# Patient Record
Sex: MALE | Race: WHITE | NOT HISPANIC OR LATINO | URBAN - METROPOLITAN AREA
[De-identification: names, ages, dates, MRNs, and addresses within clinical notes are randomized per-mention and may not be internally consistent; named-entity substitution may affect disease eponyms.]

---

## 2017-02-16 ENCOUNTER — OFFICE VISIT - RIVER FALLS (OUTPATIENT)
Dept: FAMILY MEDICINE | Facility: CLINIC | Age: 52
End: 2017-02-16

## 2017-02-16 ASSESSMENT — MIFFLIN-ST. JEOR: SCORE: 2003.49

## 2017-05-08 ENCOUNTER — OFFICE VISIT - RIVER FALLS (OUTPATIENT)
Dept: FAMILY MEDICINE | Facility: CLINIC | Age: 52
End: 2017-05-08

## 2017-07-06 ENCOUNTER — OFFICE VISIT - RIVER FALLS (OUTPATIENT)
Dept: FAMILY MEDICINE | Facility: CLINIC | Age: 52
End: 2017-07-06

## 2017-07-06 ASSESSMENT — MIFFLIN-ST. JEOR: SCORE: 1966.19

## 2017-07-07 LAB
CHOLEST SERPL-MCNC: 200 MG/DL (ref 125–200)
CHOLEST/HDLC SERPL: 3.8 {RATIO}
CREAT SERPL-MCNC: 0.91 MG/DL (ref 0.7–1.33)
GLUCOSE BLD-MCNC: 96 MG/DL (ref 65–99)
HDLC SERPL-MCNC: 52 MG/DL
LDLC SERPL CALC-MCNC: 110 MG/DL
NONHDLC SERPL-MCNC: 148 MG/DL
PSA SERPL-MCNC: 0.6 NG/ML
TRIGL SERPL-MCNC: 189 MG/DL

## 2017-07-20 ENCOUNTER — OFFICE VISIT - RIVER FALLS (OUTPATIENT)
Dept: FAMILY MEDICINE | Facility: CLINIC | Age: 52
End: 2017-07-20

## 2017-07-20 ASSESSMENT — MIFFLIN-ST. JEOR: SCORE: 1966.19

## 2017-08-22 ENCOUNTER — OFFICE VISIT - RIVER FALLS (OUTPATIENT)
Dept: FAMILY MEDICINE | Facility: CLINIC | Age: 52
End: 2017-08-22

## 2017-08-22 ASSESSMENT — MIFFLIN-ST. JEOR: SCORE: 1982.52

## 2017-08-31 ENCOUNTER — OFFICE VISIT - RIVER FALLS (OUTPATIENT)
Dept: FAMILY MEDICINE | Facility: CLINIC | Age: 52
End: 2017-08-31

## 2017-08-31 ASSESSMENT — MIFFLIN-ST. JEOR: SCORE: 1988.87

## 2017-09-14 ENCOUNTER — AMBULATORY - RIVER FALLS (OUTPATIENT)
Dept: FAMILY MEDICINE | Facility: CLINIC | Age: 52
End: 2017-09-14

## 2017-09-28 ENCOUNTER — AMBULATORY - RIVER FALLS (OUTPATIENT)
Dept: FAMILY MEDICINE | Facility: CLINIC | Age: 52
End: 2017-09-28

## 2017-10-12 ENCOUNTER — AMBULATORY - RIVER FALLS (OUTPATIENT)
Dept: FAMILY MEDICINE | Facility: CLINIC | Age: 52
End: 2017-10-12

## 2017-10-26 ENCOUNTER — AMBULATORY - RIVER FALLS (OUTPATIENT)
Dept: FAMILY MEDICINE | Facility: CLINIC | Age: 52
End: 2017-10-26

## 2017-10-31 ENCOUNTER — COMMUNICATION - RIVER FALLS (OUTPATIENT)
Dept: FAMILY MEDICINE | Facility: CLINIC | Age: 52
End: 2017-10-31

## 2017-10-31 ENCOUNTER — OFFICE VISIT - RIVER FALLS (OUTPATIENT)
Dept: FAMILY MEDICINE | Facility: CLINIC | Age: 52
End: 2017-10-31

## 2017-10-31 LAB
CREAT SERPL-MCNC: 0.83 MG/DL (ref 0.72–1.25)
GLUCOSE BLD-MCNC: 91 MG/DL (ref 65–100)

## 2017-10-31 ASSESSMENT — MIFFLIN-ST. JEOR: SCORE: 2005.2

## 2017-11-09 ENCOUNTER — AMBULATORY - RIVER FALLS (OUTPATIENT)
Dept: FAMILY MEDICINE | Facility: CLINIC | Age: 52
End: 2017-11-09

## 2017-12-07 ENCOUNTER — AMBULATORY - RIVER FALLS (OUTPATIENT)
Dept: FAMILY MEDICINE | Facility: CLINIC | Age: 52
End: 2017-12-07

## 2017-12-21 ENCOUNTER — AMBULATORY - RIVER FALLS (OUTPATIENT)
Dept: FAMILY MEDICINE | Facility: CLINIC | Age: 52
End: 2017-12-21

## 2018-01-04 ENCOUNTER — COMMUNICATION - RIVER FALLS (OUTPATIENT)
Dept: FAMILY MEDICINE | Facility: CLINIC | Age: 53
End: 2018-01-04

## 2018-01-18 ENCOUNTER — AMBULATORY - RIVER FALLS (OUTPATIENT)
Dept: FAMILY MEDICINE | Facility: CLINIC | Age: 53
End: 2018-01-18

## 2018-02-01 ENCOUNTER — AMBULATORY - RIVER FALLS (OUTPATIENT)
Dept: FAMILY MEDICINE | Facility: CLINIC | Age: 53
End: 2018-02-01

## 2018-02-15 ENCOUNTER — AMBULATORY - RIVER FALLS (OUTPATIENT)
Dept: FAMILY MEDICINE | Facility: CLINIC | Age: 53
End: 2018-02-15

## 2018-03-01 ENCOUNTER — AMBULATORY - RIVER FALLS (OUTPATIENT)
Dept: FAMILY MEDICINE | Facility: CLINIC | Age: 53
End: 2018-03-01

## 2018-03-15 ENCOUNTER — AMBULATORY - RIVER FALLS (OUTPATIENT)
Dept: FAMILY MEDICINE | Facility: CLINIC | Age: 53
End: 2018-03-15

## 2018-03-29 ENCOUNTER — AMBULATORY - RIVER FALLS (OUTPATIENT)
Dept: FAMILY MEDICINE | Facility: CLINIC | Age: 53
End: 2018-03-29

## 2018-04-12 ENCOUNTER — AMBULATORY - RIVER FALLS (OUTPATIENT)
Dept: FAMILY MEDICINE | Facility: CLINIC | Age: 53
End: 2018-04-12

## 2018-04-26 ENCOUNTER — AMBULATORY - RIVER FALLS (OUTPATIENT)
Dept: FAMILY MEDICINE | Facility: CLINIC | Age: 53
End: 2018-04-26

## 2018-05-09 ENCOUNTER — OFFICE VISIT - RIVER FALLS (OUTPATIENT)
Dept: FAMILY MEDICINE | Facility: CLINIC | Age: 53
End: 2018-05-09

## 2018-05-09 ASSESSMENT — MIFFLIN-ST. JEOR: SCORE: 1948.95

## 2018-09-11 ENCOUNTER — OFFICE VISIT - RIVER FALLS (OUTPATIENT)
Dept: FAMILY MEDICINE | Facility: CLINIC | Age: 53
End: 2018-09-11

## 2018-09-11 ASSESSMENT — MIFFLIN-ST. JEOR: SCORE: 1966.19

## 2018-12-04 ENCOUNTER — OFFICE VISIT - RIVER FALLS (OUTPATIENT)
Dept: FAMILY MEDICINE | Facility: CLINIC | Age: 53
End: 2018-12-04

## 2018-12-04 ASSESSMENT — MIFFLIN-ST. JEOR: SCORE: 1928.08

## 2018-12-05 LAB
CHOLEST SERPL-MCNC: 267 MG/DL
CHOLEST/HDLC SERPL: 4.9 {RATIO}
CREAT SERPL-MCNC: 0.94 MG/DL (ref 0.7–1.33)
GLUCOSE BLD-MCNC: 89 MG/DL (ref 65–99)
HDLC SERPL-MCNC: 55 MG/DL
LDLC SERPL CALC-MCNC: 175 MG/DL
NONHDLC SERPL-MCNC: 212 MG/DL
PSA SERPL-MCNC: 0.5 NG/ML
TRIGL SERPL-MCNC: 221 MG/DL

## 2019-05-22 ENCOUNTER — OFFICE VISIT - RIVER FALLS (OUTPATIENT)
Dept: FAMILY MEDICINE | Facility: CLINIC | Age: 54
End: 2019-05-22

## 2019-05-22 ASSESSMENT — MIFFLIN-ST. JEOR: SCORE: 1928.08

## 2019-08-29 ENCOUNTER — OFFICE VISIT - RIVER FALLS (OUTPATIENT)
Dept: FAMILY MEDICINE | Facility: CLINIC | Age: 54
End: 2019-08-29

## 2019-08-29 ASSESSMENT — MIFFLIN-ST. JEOR: SCORE: 1928.08

## 2019-10-28 ENCOUNTER — OFFICE VISIT - RIVER FALLS (OUTPATIENT)
Dept: FAMILY MEDICINE | Facility: CLINIC | Age: 54
End: 2019-10-28

## 2019-10-28 ASSESSMENT — MIFFLIN-ST. JEOR: SCORE: 1928.08

## 2019-10-29 ENCOUNTER — COMMUNICATION - RIVER FALLS (OUTPATIENT)
Dept: FAMILY MEDICINE | Facility: CLINIC | Age: 54
End: 2019-10-29

## 2019-10-29 LAB
A/G RATIO - HISTORICAL: 1.9 (ref 1–2.5)
ALBUMIN SERPL-MCNC: 4.4 GM/DL (ref 3.6–5.1)
ALP SERPL-CCNC: 54 UNIT/L (ref 40–115)
ALT SERPL W P-5'-P-CCNC: 24 UNIT/L (ref 9–46)
AST SERPL W P-5'-P-CCNC: 15 UNIT/L (ref 10–35)
BILIRUB SERPL-MCNC: 0.5 MG/DL (ref 0.2–1.2)
BUN SERPL-MCNC: 14 MG/DL (ref 7–25)
BUN/CREAT RATIO - HISTORICAL: NORMAL (ref 6–22)
CALCIUM SERPL-MCNC: 9.1 MG/DL (ref 8.6–10.3)
CHLORIDE BLD-SCNC: 106 MMOL/L (ref 98–110)
CO2 SERPL-SCNC: 27 MMOL/L (ref 20–32)
CREAT SERPL-MCNC: 0.87 MG/DL (ref 0.7–1.33)
EGFRCR SERPLBLD CKD-EPI 2021: 99 ML/MIN/1.73M2
ERYTHROCYTE [DISTWIDTH] IN BLOOD BY AUTOMATED COUNT: 12.9 % (ref 11–15)
ERYTHROCYTE [SEDIMENTATION RATE] IN BLOOD BY WESTERGREN METHOD: 2 MM/H
GLOBULIN: 2.3 (ref 1.9–3.7)
GLUCOSE BLD-MCNC: 98 MG/DL (ref 65–99)
HCT VFR BLD AUTO: 43.1 % (ref 38.5–50)
HGB BLD-MCNC: 15.2 GM/DL (ref 13.2–17.1)
LIPASE SERPL-CCNC: 20 UNIT/L (ref 7–60)
MCH RBC QN AUTO: 30.3 PG (ref 27–33)
MCHC RBC AUTO-ENTMCNC: 35.3 GM/DL (ref 32–36)
MCV RBC AUTO: 86 FL (ref 80–100)
PLATELET # BLD AUTO: 157 10*3/UL (ref 140–400)
PMV BLD: 10.3 FL (ref 7.5–12.5)
POTASSIUM BLD-SCNC: 4 MMOL/L (ref 3.5–5.3)
PROT SERPL-MCNC: 6.7 GM/DL (ref 6.1–8.1)
RBC # BLD AUTO: 5.01 10*6/UL (ref 4.2–5.8)
SODIUM SERPL-SCNC: 140 MMOL/L (ref 135–146)
WBC # BLD AUTO: 6.3 10*3/UL (ref 3.8–10.8)

## 2019-10-31 ENCOUNTER — AMBULATORY - RIVER FALLS (OUTPATIENT)
Dept: FAMILY MEDICINE | Facility: CLINIC | Age: 54
End: 2019-10-31

## 2019-11-02 LAB
CAMPYLOBACTER CULTURE: NORMAL
CLOSTRIDIUM DIFFICILE TOXIN A AND B EIA-QUEST: NORMAL
CRYPTOSPORIDIUM ANTIGEN: NORMAL
GIARDIA ANTIGEN TEST: NORMAL
H PYLORI AG STL QL IA: NORMAL
SALMONELLA/SHIGELLA SCREEN: NORMAL
SHIGA TOXIN 1: NORMAL
TRICHROME #1 - QUEST: NORMAL

## 2019-11-04 ENCOUNTER — COMMUNICATION - RIVER FALLS (OUTPATIENT)
Dept: FAMILY MEDICINE | Facility: CLINIC | Age: 54
End: 2019-11-04

## 2019-11-05 ENCOUNTER — COMMUNICATION - RIVER FALLS (OUTPATIENT)
Dept: FAMILY MEDICINE | Facility: CLINIC | Age: 54
End: 2019-11-05

## 2020-06-16 ENCOUNTER — OFFICE VISIT - RIVER FALLS (OUTPATIENT)
Dept: FAMILY MEDICINE | Facility: CLINIC | Age: 55
End: 2020-06-16

## 2020-06-16 ASSESSMENT — MIFFLIN-ST. JEOR: SCORE: 1928.08

## 2020-08-27 ENCOUNTER — OFFICE VISIT - RIVER FALLS (OUTPATIENT)
Dept: FAMILY MEDICINE | Facility: CLINIC | Age: 55
End: 2020-08-27

## 2020-08-27 ASSESSMENT — MIFFLIN-ST. JEOR: SCORE: 1950.76

## 2020-12-29 ENCOUNTER — OFFICE VISIT - RIVER FALLS (OUTPATIENT)
Dept: FAMILY MEDICINE | Facility: CLINIC | Age: 55
End: 2020-12-29

## 2022-02-11 VITALS
OXYGEN SATURATION: 97 % | SYSTOLIC BLOOD PRESSURE: 120 MMHG | BODY MASS INDEX: 37.62 KG/M2 | BODY MASS INDEX: 37.09 KG/M2 | SYSTOLIC BLOOD PRESSURE: 102 MMHG | HEIGHT: 69 IN | DIASTOLIC BLOOD PRESSURE: 72 MMHG | HEIGHT: 69 IN | TEMPERATURE: 98 F | HEART RATE: 68 BPM | BODY MASS INDEX: 37.09 KG/M2 | DIASTOLIC BLOOD PRESSURE: 74 MMHG | HEART RATE: 67 BPM | SYSTOLIC BLOOD PRESSURE: 118 MMHG | WEIGHT: 250.4 LBS | HEIGHT: 69 IN | TEMPERATURE: 97 F | TEMPERATURE: 98.7 F | BODY MASS INDEX: 37.83 KG/M2 | WEIGHT: 254 LBS | WEIGHT: 250.4 LBS | OXYGEN SATURATION: 95 % | WEIGHT: 255.4 LBS | HEART RATE: 72 BPM | DIASTOLIC BLOOD PRESSURE: 72 MMHG | HEIGHT: 69 IN | DIASTOLIC BLOOD PRESSURE: 68 MMHG | HEART RATE: 68 BPM | SYSTOLIC BLOOD PRESSURE: 116 MMHG

## 2022-02-11 VITALS
WEIGHT: 242 LBS | TEMPERATURE: 97.5 F | BODY MASS INDEX: 35.84 KG/M2 | BODY MASS INDEX: 35.84 KG/M2 | DIASTOLIC BLOOD PRESSURE: 74 MMHG | HEIGHT: 69 IN | WEIGHT: 242 LBS | SYSTOLIC BLOOD PRESSURE: 128 MMHG | HEART RATE: 65 BPM | OXYGEN SATURATION: 98 % | HEIGHT: 69 IN | DIASTOLIC BLOOD PRESSURE: 82 MMHG | HEART RATE: 70 BPM | OXYGEN SATURATION: 98 % | SYSTOLIC BLOOD PRESSURE: 132 MMHG

## 2022-02-11 VITALS
TEMPERATURE: 98.5 F | HEART RATE: 80 BPM | SYSTOLIC BLOOD PRESSURE: 130 MMHG | DIASTOLIC BLOOD PRESSURE: 88 MMHG | BODY MASS INDEX: 36.58 KG/M2 | WEIGHT: 247 LBS | HEIGHT: 69 IN

## 2022-02-11 VITALS
HEIGHT: 69 IN | OXYGEN SATURATION: 98 % | SYSTOLIC BLOOD PRESSURE: 134 MMHG | BODY MASS INDEX: 37.09 KG/M2 | HEART RATE: 69 BPM | DIASTOLIC BLOOD PRESSURE: 88 MMHG | WEIGHT: 250.4 LBS

## 2022-02-11 VITALS
HEIGHT: 69 IN | DIASTOLIC BLOOD PRESSURE: 102 MMHG | HEART RATE: 86 BPM | WEIGHT: 259 LBS | TEMPERATURE: 98.8 F | BODY MASS INDEX: 38.36 KG/M2 | DIASTOLIC BLOOD PRESSURE: 82 MMHG | SYSTOLIC BLOOD PRESSURE: 110 MMHG | SYSTOLIC BLOOD PRESSURE: 152 MMHG

## 2022-02-11 VITALS
SYSTOLIC BLOOD PRESSURE: 134 MMHG | HEART RATE: 76 BPM | WEIGHT: 252.6 LBS | DIASTOLIC BLOOD PRESSURE: 86 MMHG | BODY MASS INDEX: 36.5 KG/M2

## 2022-02-11 VITALS
BODY MASS INDEX: 35.84 KG/M2 | HEIGHT: 69 IN | SYSTOLIC BLOOD PRESSURE: 128 MMHG | OXYGEN SATURATION: 97 % | DIASTOLIC BLOOD PRESSURE: 84 MMHG | HEART RATE: 66 BPM | WEIGHT: 242 LBS

## 2022-02-11 VITALS
DIASTOLIC BLOOD PRESSURE: 76 MMHG | BODY MASS INDEX: 36.53 KG/M2 | SYSTOLIC BLOOD PRESSURE: 130 MMHG | HEART RATE: 63 BPM | WEIGHT: 246.6 LBS | OXYGEN SATURATION: 98 % | HEIGHT: 69 IN

## 2022-02-11 VITALS — BODY MASS INDEX: 36.48 KG/M2 | HEIGHT: 69 IN

## 2022-02-11 VITALS
SYSTOLIC BLOOD PRESSURE: 120 MMHG | HEIGHT: 70 IN | TEMPERATURE: 97.2 F | DIASTOLIC BLOOD PRESSURE: 80 MMHG | OXYGEN SATURATION: 99 % | BODY MASS INDEX: 36.65 KG/M2 | HEART RATE: 64 BPM | WEIGHT: 256 LBS

## 2022-02-11 VITALS
OXYGEN SATURATION: 99 % | SYSTOLIC BLOOD PRESSURE: 128 MMHG | BODY MASS INDEX: 35.84 KG/M2 | DIASTOLIC BLOOD PRESSURE: 86 MMHG | HEART RATE: 57 BPM | HEIGHT: 69 IN | WEIGHT: 242 LBS

## 2022-02-11 VITALS
DIASTOLIC BLOOD PRESSURE: 78 MMHG | WEIGHT: 242 LBS | HEIGHT: 69 IN | BODY MASS INDEX: 35.84 KG/M2 | SYSTOLIC BLOOD PRESSURE: 132 MMHG | OXYGEN SATURATION: 97 % | TEMPERATURE: 98.2 F | HEART RATE: 62 BPM

## 2022-02-16 NOTE — PROGRESS NOTES
Patient:   MIMI MART            MRN: [id number]            FIN: [id number]               Age:   54 years     Sex:  Male     :  1965   Associated Diagnoses:   ADD (attention deficit disorder); GERD (gastroesophageal reflux disease); Allergic Rhinitis   Author:   Max MCLEOD, Karson      Impression and Plan   Diagnosis     ADD (attention deficit disorder) (PUS71-ZR F98.8).     Course:  Progressing as expected.    Orders     Orders   Charges (Evaluation and Management):  00664 office outpatient visit 25 minutes (Charge) (Order): Quantity: 1, Allergic Rhinitis  GERD (gastroesophageal reflux disease)  BPH (benign prostatic hypertrophy)  Hyperlipidemia  ADD (attention deficit disorder).     Orders (Selected)   Prescriptions  Prescribed  methylphenidate 20 mg oral tablet: = 1 tab(s), Oral, daily, # 30 tab(s), 0 Refill(s), Type: Soft Stop, Pharmacy: [facility], 1 tab(s) Oral daily.     Diagnosis     GERD (gastroesophageal reflux disease) (TZH53-BD K21.9).     Course:  Progressing as expected.    Orders     Orders (Selected)   Prescriptions  Prescribed  omeprazole 20 mg oral delayed release capsule: = 1 cap(s), Oral, bid, # 180 cap(s), 1 Refill(s), Type: Maintenance, Pharmacy: [facility].     Diagnosis     Allergic Rhinitis (CKN89-VR J30.1).     Course:  Worsening.    Orders     Orders (Selected)   Outpatient Orders  Completed  Kenalog-40: 80 mg, im, once  Prescriptions  Prescribed  ProAir HFA 90 mcg/inh inhalation aerosol: See Instructions, Instructions: INHALE TWO PUFFS BY MOUTH FOUR TIMES A DAY AS NEEDED FOR WHEEZING OF FOR SHORTNESS OF BREATH, # 8.5 gm, 1 Refill(s), Type: Soft Stop, Pharmacy: [facility], INHALE TWO PUFFS BY MOUTH FOUR TIMES A DAY AS NEEDED FOR...  montelukast 10 mg oral tablet: = 1 tab(s) ( 10 mg ), Oral, daily, # 90 tab(s), 1 Refill(s), Type: Maintenance, Pharmacy: [facility], 1 tab(s) Oral daily.        Visit Information      Date of Service: 2019 04:10 pm   Performing Location: [facility]  Encounter#: [id number]      Primary Care Provider (PCP):  Karson Santana MD, NPI# [id number]      Referring Provider:  Karson Santana MD# [id number]   Visit type:  Scheduled follow-up.    Accompanied by:  No one.    Source of history:  Self.    Referral source:  Self.    History limitation:  None.       Chief Complaint   5/22/2019 4:18 PM CDT    Pt here for allergies        History of Present Illness             The patient presents Attention Deficit Discorder.  There are no modifying factors.  Associated symptoms consist of denies decrease in social interaction, denies fatigue, denies impaired judgement, denies insomnia, denies loss of appetite and denies weight loss.  Additional pertinent history: none.               The patient presents with rhinorrhea.  The rhinorrhea is from both nostrils.  The rhinorrhea is described as clear.  The severity of the rhinorrhea is severe.  The rhinorrhea is constant and is worsening.  The context of the rhinorrhea: occurred after exposure to allergens.  Exacerbating factors consist of pollen.  Relieving factors consist of allergen avoidance and medication.  Associated symptoms consist of itchy eyes, nasal congestion, sneezing, denies fever, denies headache, denies cough, denies ear pain, denies facial pain and denies sore throat.        Interval History   Reflux Esophagitis   The course is progressing as expected.  Compliance problems: none.  The effect on daily activities is no change in eating habits, no change in sleeping patterns and no change in work/school duties.  There were disease related encounters including none.  Associated symptoms characterized by no weight loss, chest pain, wheeze, epigastric pain, hematemesis, loss of appetite or nausea.        Review of Systems   Constitutional:  Fatigue.    Eye:  Negative.    Ear/Nose/Mouth/Throat:  Negative.    Respiratory:  Negative.    Cardiovascular:  Negative.     Gastrointestinal:  Negative.    Genitourinary:  Negative.    Hematology/Lymphatics:  Negative.    Endocrine:  Negative.    Immunologic:  Negative.    Musculoskeletal:  Negative.    Integumentary:  Negative.    Neurologic:  Negative.    Psychiatric:  Negative.    All other systems reviewed and negative      Health Status   Allergies:    Allergic Reactions (Selected)  Severity Not Documented  Dilantin (Rash)   Medications:  (Selected)   Prescriptions  Prescribed  ProAir HFA 90 mcg/inh inhalation aerosol: See Instructions, Instructions: INHALE TWO PUFFS BY MOUTH FOUR TIMES A DAY AS NEEDED FOR WHEEZING OF FOR SHORTNESS OF BREATH, # 8.5 gm, 1 Refill(s), Type: Soft Stop, Pharmacy: [facility], INHALE TWO PUFFS BY MOUTH FOUR TIMES A DAY AS NEEDED FOR...  alfuzosin 10 mg oral tablet, extended release: = 1 tab(s) ( 10 mg ), po, daily, # 90 tab(s), 1 Refill(s), Type: Maintenance, Pharmacy: [facility], 1 tab(s) Oral daily  desoximetasone 0.05% topical cream: 1 jeff, TOP, BID, # 60 g, 1 Refill(s), Type: Maintenance, Pharmacy: [facility], 1 jeff Topical bid  furosemide 20 mg oral tablet: = 1 tab(s) ( 20 mg ), PO, Daily, PRN: for leg swelling, # 90 tab(s), 1 Refill(s), Type: Maintenance, Pharmacy: [facility], 1 tab(s) Oral daily,PRN:for leg swelling  methylphenidate 20 mg oral tablet: = 1 tab(s), Oral, daily, # 30 tab(s), 0 Refill(s), Type: Soft Stop, Pharmacy: [facility], 1 tab(s) Oral daily  montelukast 10 mg oral tablet: = 1 tab(s) ( 10 mg ), Oral, daily, # 90 tab(s), 1 Refill(s), Type: Maintenance, Pharmacy: [facility], 1 tab(s) Oral daily  omeprazole 20 mg oral delayed release capsule: = 1 cap(s), Oral, bid, # 180 cap(s), 1 Refill(s), Type: Maintenance, Pharmacy: [facility]   Problem list:    All Problems  Acne / ICD-9-.1 / Confirmed  ADD (attention deficit disorder) / SNOMED CT 1371620801 / Confirmed  Adenomatous colon polyp / SNOMED CT 3927827164 / Confirmed  Allergic Rhinitis  / ICD-9-.9 / Confirmed  Asthma / ICD-9-.90 / Confirmed  BPH (benign prostatic hypertrophy) / SNOMED CT 394082060 / Confirmed  Chronic Lumbar Pain / ICD-9-.2 / Confirmed  CTS (Carpal Tunnel Syndrome) / ICD-9-.0 / Confirmed  External Hemorrhoids / ICD-9-.3 / Confirmed  GERD (gastroesophageal reflux disease) / SNOMED CT 968414184 / Confirmed  Hyperlipidemia / SNOMED CT 83164766 / Confirmed  Increased BMI / ICD-9-.9 / Confirmed  Low testosterone / SNOMED CT 9633390266 / Confirmed  Lower leg edema / SNOMED CT 632424088 / Confirmed  Male hypogonadism / SNOMED CT 93595168 / Confirmed  Obesity / ICD-9-.00 / Probable  Onychomycosis / ICD-9-.1 / Confirmed  Osteoarthritis of Knee / ICD-9-.96 / Confirmed  Stasis Dermatitis / ICD-9-.1 / Confirmed  Canceled: Hyperlipemia / ICD-9-.4      Histories   Past Medical History:    Active  Onychomycosis (110.1): Onset on 2008 at 43 years.  Comments:  2010 CST 10:46 AM CST - Ericka BLAKE Morena  left small nail  Osteoarthritis of Knee (715.96)  Chronic Lumbar Pain (724.2)  Allergic Rhinitis (477.9)  Asthma (493.90)  External Hemorrhoids (455.3)  Stasis Dermatitis (454.1)  Acne (706.1)  CTS (Carpal Tunnel Syndrome) (354.0)  Increased BMI (783.9)   Family History:    Hypertension  Father ()  Heart disease  Father ()     Procedure history:    Colonoscopy (SNOMED CT 913958155) performed by Duarte Dean MD on 2018 at 53 Years.  Comments:  1/10/2019 9:36 AM CST - Tory Hicks  Indication:  Screening.  Sedation:  MAC.  Findings:  Polyps cecum, sigmoid colon, and right colon.  Thrombosed external hemorrhoids.  Perianal skin tags.  Recommendation:  Repeat in 5 years.   Social History:        Alcohol Assessment: Current            Current, 1-2 times per week      Tobacco Assessment: Denies Tobacco Use      Substance Abuse Assessment: Denies Substance Abuse      Employment and Education Assessment             Employed      Home and Environment Assessment            Marital status: .  2 children.  Living situation: Home/Independent.      Nutrition and Health Assessment                     Comments:                      12/05/2018 - Shamika Astorga                     Restrictions:  Green peppers.      Exercise and Physical Activity Assessment: Regular exercise            Exercise frequency: Daily.  Exercise type: work.      Physical Examination   Vital Signs   5/22/2019 4:18 PM CDT Peripheral Pulse Rate 66 bpm    Systolic Blood Pressure 128 mmHg    Diastolic Blood Pressure 84 mmHg  HI    Mean Arterial Pressure 99 mmHg    Oxygen Saturation 97 %      Measurements from flowsheet : Measurements   5/22/2019 4:18 PM CDT Height Measured - Standard 69 in    Weight Measured - Standard 242 lb    BSA 2.31 m2    Body Mass Index 35.73 kg/m2  HI      General:  No acute distress.    Neck:  Supple, No lymphadenopathy, No thyromegaly.    Respiratory:  Lungs are clear to auscultation, Respirations are non-labored, Breath sounds are equal, Symmetrical chest wall expansion.    Cardiovascular:  Normal rate, Regular rhythm, No murmur, No gallop, Good pulses equal in all extremities, Normal peripheral perfusion, No edema.    Gastrointestinal:  Soft, Non-tender, Non-distended, Normal bowel sounds, No organomegaly.    Integumentary:  Warm, Dry, Pink.    Neurologic:  Alert, Oriented.    Psychiatric:  Cooperative, Appropriate mood & affect.

## 2022-02-16 NOTE — TELEPHONE ENCOUNTER
---------------------  From: Bethany Garcia CMA (eRx Pool ([id number]_[facility]))   To: Karson Santana MD;     Sent: 2/1/2021 4:23:49 PM CST  Subject: FW: Medication Management   Due Date/Time: 2/2/2021 3:18:00 PM CST             ** Submitted: **  Order:alfuzosin (alfuzosin 10 mg oral tablet, extended release)  1 tab(s)  Oral  daily  Qty:  90 unknown unit        Days Supply:  90        Refills:  1          Substitutions Allowed     Route To Pharmacy - [facility]    Signed by Bethany Garcia CMA  2/1/2021 10:23:00 PM UT    ** Submitted: **  Complete:alfuzosin (alfuzosin 10 mg oral tablet, extended release)   Signed by Bethany Garcia CMA  2/1/2021 10:23:00 PM UT    ** Not Approved:  **  alfuzosin (ALFUZOSIN HCL ER 10MG TB24)  TAKE ONE TABLET BY MOUTH EVERY DAY  Qty:  90 unknown unit        Days Supply:  90        Refills:  1          Substitutions Allowed     Route To Pharmacy - [facility]   Signed by Bethany Garcia CMA            ------------------------------------------  From: [facility]  To: Karson Santana MD  Sent: February 1, 2021 3:18:39 PM CST  Subject: Medication Management  Due: January 30, 2021 4:21:54 PM CST     ** On Hold Pending Signature **     Drug: alfuzosin (alfuzosin 10 mg oral tablet, extended release), TAKE ONE TABLET BY MOUTH EVERY DAY  Quantity: 90 unknown unit  Days Supply: 90  Refills: 0  Substitutions Allowed  Notes from Pharmacy:     Dispensed Drug: alfuzosin (alfuzosin 10 mg oral tablet, extended release), TAKE ONE TABLET BY MOUTH EVERY DAY  Quantity: 90 unknown unit  Days Supply: 90  Refills: 1  Substitutions Allowed  Notes from Pharmacy:     ** On Hold Pending Signature **     Drug: methylphenidate (methylphenidate 20 mg oral tablet), TAKE ONE TABLET BY MOUTH EVERY DAY  Quantity: 30 unknown unit  Days Supply: 30  Refills: 0  Substitutions Allowed  Notes from Pharmacy:     Dispensed Drug: methylphenidate (methylphenidate 20 mg oral tablet), TAKE ONE TABLET BY  MOUTH EVERY DAY  Quantity: 30 unknown unit  Days Supply: 30  Refills: 0  Substitutions Allowed  Notes from Pharmacy:  ------------------------------------------

## 2022-02-16 NOTE — PROGRESS NOTES
Patient:   MIMI MART            MRN: [id number]            FIN: [id number]               Age:   53 years     Sex:  Male     :  1965   Associated Diagnoses:   Well adult exam   Author:   Karson Santana MD      Impression and Plan   Diagnosis     Well adult exam (CIC13-NI Z00.00).     Course:  Progressing as expected.    Plan   Orders     Orders   Charges (Evaluation and Management):  27023 periodic preventive med est patient 40-64yrs (Charge) (Order): Quantity: 1, Well adult exam.     Orders (Selected)   Outpatient Orders  Ordered  Referral (Request): 18 16:25:00 CST, Referred to: General Surgery, Referred to: Duarte Dean in [place], Hemorrhoids  Ordered (Dispatched)  CBC (h/h, RBC, indices, WBC, Plt)* ([facility]): Specimen Type: Blood, Collection Date: 18 16:26:00 CST  Comprehensive Metabolic Panel* ([facility]): Specimen Type: Serum, Collection Date: 18 16:26:00 CST  Lipid panel with reflex to direct ldl* ([facility]): Specimen Type: Serum, Collection Date: 18 16:26:00 CST  PSA, Total (Room)* ([facility]): Specimen Type: Serum, Collection Date: 18 16:26:00 CST.        Visit Information      Date of Service: 2018 03:48 pm  Performing Location: [facility]  Encounter#: [id number]      Primary Care Provider (PCP):  Karson Santana MD    NPI# [id number]      Referring Provider:  Karson Santana MD, NPI# [id number]   Visit type:  Annual exam.    Accompanied by:  No one.    Source of history:  Self.    History limitation:  None.       Chief Complaint   Chief complaint discussed and confirmed correct.     2018 3:57 PM CST    Pt here for px        Well Adult History   Well Adult History             The patient presents for well adult exam.  The patient's general health status is described as good.  The patient's diet is described as balanced.  Exercise: routine.  Associated symptoms consist of none.  Medical encounters: none.  Compliance problems: none.        Review of  Systems   Constitutional:  Fatigue, insomnia.    Eye:  Negative.    Ear/Nose/Mouth/Throat:  Negative.    Respiratory:  Negative.    Cardiovascular:  Negative.    Gastrointestinal:  Heartburn, Hemorrhoids.    Genitourinary:  Nocturia.    Hematology/Lymphatics:  Negative.    Endocrine:  Negative.    Immunologic:  Negative.    Musculoskeletal:  Joint pain.         Back pain: In the lower region.    Integumentary:  Negative.    Neurologic:  Negative.    Psychiatric:  Negative.    All other systems reviewed and negative      Health Status   Allergies:    Allergic Reactions (Selected)  Severity Not Documented  Dilantin (Rash)   Medications:  (Selected)   Prescriptions  Prescribed  ProAir HFA 90 mcg/inh inhalation aerosol: See Instructions, Instructions: INHALE TWO PUFFS BY MOUTH FOUR TIMES A DAY AS NEEDED FOR WHEEZING OF FOR SHORTNESS OF BREATH, # 8.5 gm, 1 Refill(s), Type: Soft Stop, Pharmacy: [facility], INHALE TWO PUFFS BY MOUTH FOUR TIMES A DAY AS NEEDED FOR...  alfuzosin 10 mg oral tablet, extended release: = 1 tab(s) ( 10 mg ), po, daily, # 30 tab(s), 11 Refill(s), Type: Maintenance, Pharmacy: [facility], 1 tab(s) Oral daily  desoximetasone 0.05% topical cream: 1 jeff, TOP, BID, # 60 g, 1 Refill(s), Type: Maintenance, Pharmacy: [facility], 1 jeff Topical bid  furosemide 20 mg oral tablet: = 1 tab(s) ( 20 mg ), PO, Daily, PRN: for leg swelling, # 30 tab(s), 11 Refill(s), Type: Maintenance, Pharmacy: [facility], 1 tab(s) Oral daily,PRN:for leg swelling  methylphenidate 20 mg oral tablet: = 1 tab(s) ( 20 mg ), PO, daily, # 30 tab(s), 0 Refill(s), Type: Maintenance, Pharmacy: [facility], 1 tab(s) Oral daily  montelukast 10 mg oral tablet: = 1 tab(s) ( 10 mg ), Oral, daily, # 30 tab(s), 11 Refill(s), Type: Maintenance, Pharmacy: [facility], 1 tab(s) Oral daily  omeprazole 20 mg oral delayed release capsule: = 1 cap(s) ( 20 mg ), po, daily, # 30 cap(s), 11 Refill(s), Type: Maintenance,  Pharmacy: [facility], 1 cap(s) Oral daily   Problem list:    All Problems  Acne / ICD-9-.1 / Confirmed  ADD (attention deficit disorder) / SNOMED CT 8238756606 / Confirmed  Allergic Rhinitis / ICD-9-.9 / Confirmed  Asthma / ICD-9-.90 / Confirmed  BPH (benign prostatic hypertrophy) / SNOMED CT 090097731 / Confirmed  Chronic Lumbar Pain / ICD-9-.2 / Confirmed  CTS (Carpal Tunnel Syndrome) / ICD-9-.0 / Confirmed  External Hemorrhoids / ICD-9-.3 / Confirmed  GERD (gastroesophageal reflux disease) / SNOMED CT 044559340 / Confirmed  Hyperlipidemia / SNOMED CT 89751673 / Confirmed  Increased BMI / ICD-9-.9 / Confirmed  Low testosterone / SNOMED CT 9337541261 / Confirmed  Lower leg edema / SNOMED CT 044410973 / Confirmed  Male hypogonadism / SNOMED CT 39591058 / Confirmed  Obesity / ICD-9-.00 / Probable  Onychomycosis / ICD-9-.1 / Confirmed  Osteoarthritis of Knee / ICD-9-.96 / Confirmed  Stasis Dermatitis / ICD-9-.1 / Confirmed  Canceled: Hyperlipemia / ICD-9-.4      Histories   Past Medical History:    Active  Onychomycosis (110.1): Onset on 2008 at 43 years.  Comments:  2010 CST 10:46 AM CST - Barnett CMA, Morena  left small nail  Osteoarthritis of Knee (715.96)  Chronic Lumbar Pain (724.2)  Allergic Rhinitis (477.9)  Asthma (493.90)  External Hemorrhoids (455.3)  Stasis Dermatitis (454.1)  Acne (706.1)  CTS (Carpal Tunnel Syndrome) (354.0)  Increased BMI (783.9)   Family History:    Hypertension  Father ()  Heart disease  Father ()     Procedure history:    No active procedure history items have been selected or recorded.   Social History:        Alcohol Assessment: Current            Current, 1-2 times per week      Tobacco Assessment: Denies Tobacco Use      Substance Abuse Assessment: Denies Substance Abuse      Employment and Education Assessment            Employed      Home and Environment Assessment             Marital status: .  2 children.  Living situation: Home/Independent.      Exercise and Physical Activity Assessment: Occasional exercise      Physical Examination   Vital signs reviewed  and within acceptable limits    Vital Signs   12/4/2018 3:57 PM CST Peripheral Pulse Rate 57 bpm  LOW    Systolic Blood Pressure 128 mmHg    Diastolic Blood Pressure 86 mmHg  HI    Mean Arterial Pressure 100 mmHg    BP Site Left arm    Oxygen Saturation 99 %      Measurements from flowsheet : Measurements   12/4/2018 3:57 PM CST Height Measured - Standard 69 in    Weight Measured - Standard 242 lb    BSA 2.31 m2    Body Mass Index 35.73 kg/m2  HI      General:  Alert and oriented, No acute distress.    Eye:  Pupils are equal, round and reactive to light, Extraocular movements are intact, Normal conjunctiva.    HENT:  Normocephalic, Tympanic membranes are clear, Normal hearing, Oral mucosa is moist, No pharyngeal erythema.    Neck:  Supple, Non-tender, No carotid bruit, No jugular venous distention, No lymphadenopathy, No thyromegaly.    Respiratory:  Lungs are clear to auscultation, Respirations are non-labored, Breath sounds are equal, Symmetrical chest wall expansion, No chest wall tenderness.    Cardiovascular:  Normal rate, Regular rhythm, No murmur, No gallop, Good pulses equal in all extremities, Normal peripheral perfusion, No edema.    Gastrointestinal:  Soft, Non-tender, Non-distended, Normal bowel sounds, No organomegaly.    Lymphatics:  No lymphadenopathy neck, axilla, groin.    Musculoskeletal:  Normal range of motion, Normal strength, No tenderness, No swelling, No deformity, Normal gait.    Integumentary:  Warm, Dry, Pink.    Neurologic:  Normal sensory, Normal motor function, No focal deficits, Cranial Nerves II-XII are grossly intact, Normal deep tendon reflexes.    Psychiatric:  Cooperative, Appropriate mood & affect, Normal judgment.

## 2022-02-16 NOTE — LETTER
(Inserted Image. Unable to display)       December 05, 2019      MIMI MART  [address] [place], [place] [id number]          Dear MIMI,      Thank you for selecting [facility] for your healthcare needs.     Our records indicate you are due for the following services:    Fasting Lab Tests ~ Please do not eat or drink anything 10 hours prior to your scheduled appointment time.  (Water and any medications that you may need are allowed unless directed otherwise.)    If you had your labs done at another facility or with Direct Access Lab Testing at [facility], please bring in a copy of the results to your next visit, mail a copy, or drop off a copy of your results to your Healthcare Provider.    Annual Well Adult Exam    You are due for lab work and an office visit; please schedule the lab appointment 1 week before the office visit.  This will assure all results are available to discuss with your Healthcare Provider during your visit.    **It is very helpful if you bring your medication bottles to your appointment.  This assures we have all of your current medications, including strength and dosing information, documented accurately in your medical record.    To schedule an appointment or if you have further questions, please contact your primary clinic:   [facility]       [phone]   [facility]       [phone]              [facility]     [phone]    Powered by [employer]    Sincerely,    Karson Santana MD

## 2022-02-16 NOTE — LETTER
(Inserted Image. Unable to display)   [address] [facility] [address]  October 29, 2019      MIMI MART  [address] [place] [id number]        Dear MIMI,     Thank you for selecting [facility] for your healthcare needs. Below you will find the results of the recent tests done at our clinic.        All results are within acceptable limits. No treatment changes are recommended at this time.      Result Name Current Result Previous Result Reference Range   Sodium Level (mmol/L)  140 10/28/2019  141 12/4/2018 135 - 146   Potassium Level (mmol/L)  4.0 10/28/2019  4.1 12/4/2018 3.5 - 5.3   Chloride Level (mmol/L)  106 10/28/2019  103 12/4/2018 98 - 110   CO2 Level (mmol/L)  27 10/28/2019  29 12/4/2018 20 - 32   Glucose Level (mg/dL)  98 10/28/2019  89 12/4/2018 65 - 99   BUN (mg/dL)  14 10/28/2019  19 12/4/2018 7 - 25   Creatinine Level (mg/dL)  0.87 10/28/2019  0.94 12/4/2018 0.70 - 1.33   BUN/Creat Ratio  NOT APPLICABLE 10/28/2019  NOT APPLICABLE 12/4/2018 6 - 22   eGFR (mL/min/1.73m2)  99 10/28/2019  93 12/4/2018 > OR = 60 -    eGFR  (mL/min/1.73m2)  115 10/28/2019  108 12/4/2018 > OR = 60 -    Calcium Level (mg/dL)  9.1 10/28/2019  9.5 12/4/2018 8.6 - 10.3   Bilirubin Total (mg/dL)  0.5 10/28/2019  0.7 12/4/2018 0.2 - 1.2   Alkaline Phosphatase (unit/L)  54 10/28/2019  52 12/4/2018 40 - 115   AST/SGOT (unit/L)  15 10/28/2019  23 12/4/2018 10 - 35   ALT/SGPT (unit/L)  24 10/28/2019  30 12/4/2018 9 - 46   Protein Total (gm/dL)  6.7 10/28/2019  7.0 12/4/2018 6.1 - 8.1   Albumin Level (gm/dL)  4.4 10/28/2019  4.5 12/4/2018 3.6 - 5.1   Globulin  2.3 10/28/2019  2.5 12/4/2018 1.9 - 3.7   A/G Ratio  1.9 10/28/2019  1.8 12/4/2018 1.0 - 2.5   Lipase Level (unit/L)  20 10/28/2019  7 - 60   WBC  6.3 10/28/2019  8.3 12/4/2018 3.8 - 10.8   RBC  5.01 10/28/2019  5.02 12/4/2018 4.20 - 5.80   Hgb (gm/dL)  15.2 10/28/2019  15.7 12/4/2018 13.2 - 17.1   Hct (%)  43.1 10/28/2019   44.0 12/4/2018 38.5 - 50.0   MCV (fL)  86.0 10/28/2019  87.6 12/4/2018 80.0 - 100.0   MCH (pg)  30.3 10/28/2019  31.3 12/4/2018 27.0 - 33.0   MCHC (gm/dL)  35.3 10/28/2019  35.7 12/4/2018 32.0 - 36.0   RDW (%)  12.9 10/28/2019  12.9 12/4/2018 11.0 - 15.0   Platelet  157 10/28/2019  187 12/4/2018 140 - 400   MPV (fL)  10.3 10/28/2019  9.3 12/4/2018 7.5 - 12.5   Sed Rate  2 10/28/2019   - < OR = 20       Please contact my practice at [phone]  if you have any questions or concerns.     Sincerely,        Karson Santana MD          What do your labs mean?  Below is a glossary of commonly ordered labs:  LDL   Bad Cholesterol   HDL   Good Cholesterol  AST/ALT   Liver Function   Cr/Creatinine   Kidney Function  Microalbumin   Kidney Function  BUN   Kidney Function  PSA   Prostate    TSH   Thyroid Hormone  HgbA1c   Diabetes Test   Hgb (Hemoglobin)   Red Blood Cells

## 2022-02-16 NOTE — PROGRESS NOTES
Chief Complaint    1.  F/u ADD  2.  req Kenalog shot for allergies - typically gets in Spring and Fall - allergy to Ragweed  History of Present Illness      Mimi is a 55-year-old with a history of hypogonadism, seasonal allergies, and asthma, presents for Kenalog injection.  He has severe ragweed allergy and typically 2 or 3 times a year he gets Kenalog 80 mg IM for relief.  He is used nasal steroid sprays without much benefit in the past he is on Singulair and albuterol.  He rarely uses albuterol.  He also tells me he like to start on testosterone replacement he was on injection testosterone briefly few years ago and it was helpful but he got acne on his back and did not like him to come in for the injections and he wonders if there is an option.  Review of Systems      No fever, chills, cough, dyspnea, headache, myalgia, wheezing, shortness of breath.  Physical Exam   Vitals & Measurements    T: 98.5  F (Tympanic)  HR: 80 (Peripheral)  BP: 130/88     HT: 69 in  WT: 247 lb  BMI: 36.47       Patient is an obese man in no distress.  Alert and oriented.  HEENT exam is unremarkable.  Chest is clear.  Cardiac exam regular.  Assessment/Plan       Allergic rhinitis, seasonal (J30.2)         I am Kenalog as per usual instructions.  Did discuss the risks and benefits including infection.       Asthma (J45.909)         Stable.       Male hypogonadism (E29.1)        Recommended repeating free and total testosterone level as it is been several years.        Orders:         Return to Clinic (Request), RFV: AM free and total testosterone  Patient Information     Name:MIMI MART      Address:      [address] [place] [id number]     Sex:Male     YOB: 1965     [phone]     Emergency Contact:MADAN BARRETT     MRN:[id number]     FIN:[id number]     Location:[facility]     Date of Service:08/27/2020      Primary Care Physician:       Max MCLEOD, Karson, [phone]       Attending Physician:       Markel Foley MD, [phone]  Problem List/Past Medical History    Ongoing     Acne     ADD (attention deficit disorder)     Adenomatous colon polyp     Allergic Rhinitis     Allergic rhinitis, seasonal     Asthma     BPH (benign prostatic hypertrophy)     Chronic Lumbar Pain     CTS (Carpal Tunnel Syndrome)     External Hemorrhoids     GERD (gastroesophageal reflux disease)     Hyperlipidemia     Increased BMI     Low testosterone     Lower leg edema     Male hypogonadism     Obesity     Onychomycosis       Comments: left small nail     Osteoarthritis of Knee     Stasis Dermatitis    Historical     No qualifying data  Procedure/Surgical History     Colonoscopy (12/19/2018)      Comments: Indication:  Screening.      Sedation:  MAC.      Findings:  Polyps cecum, sigmoid colon, and right colon.  Thrombosed external hemorrhoids.  Perianal skin tags.      Recommendation:  Repeat in 5 years..  Medications    alfuzosin 10 mg oral tablet, extended release, 1 tab(s), Oral, daily, 1 refills    cyclobenzaprine 10 mg oral tablet, 1 tab(s), Oral, q6 hr, PRN, 5 refills    desoximetasone 0.05% topical cream, 1 jeff, Topical, bid, 1 refills    furosemide 20 mg oral tablet, 20 mg= 1 tab(s), Oral, daily, PRN, 1 refills    loratadine 10 mg oral tablet, 10 mg= 1 tab(s), Oral, daily    methylphenidate 20 mg oral tablet, 1 tab(s), Oral, daily    montelukast 10 mg oral tablet, 1 tab(s), Oral, daily, 1 refills    omeprazole 20 mg oral delayed release capsule, 1 cap(s), Oral, bid, 1 refills    ProAir HFA 90 mcg/inh inhalation aerosol, See Instructions, 1 refills  Allergies    Dilantin (rash)  Social History    Smoking Status     Never smoker     Alcohol - Current      Current, 1-2 times per week     Employment/School      Employed     Exercise - Regular exercise      Exercise frequency: Daily. Exercise type: work.     Home/Environment      Marital status: . 2 children. Living situation:  Home/Independent.     Nutrition/Health     Substance Abuse - Denies Substance Abuse     Tobacco - Denies Tobacco Use  Family History    Heart disease: Father.    Hypertension: Father.    Son: History is unknown    Son: History is unknown  Immunizations      Vaccine Date Status          tetanus/diphth/pertuss (Tdap) adult/adol 12/04/2018 Given          tetanus/diphth/pertuss (Tdap) adult/adol 09/08/2008 Recorded

## 2022-02-16 NOTE — NURSING NOTE
LM at 3 20 pm for pt to call [facility] office to speak with myself or Ariella.    (Pt hasnt returned for Testosterone Injection on 11/23/17)Pt called and scheduled his injection for 12/7/17.

## 2022-02-16 NOTE — PROGRESS NOTES
Patient:   MIMI MART            MRN: [id number]            FIN: [id number]               Age:   51 years     Sex:  Male     :  1965   Associated Diagnoses:   Bronchitis   Author:   Karson Santana MD      Impression and Plan   Diagnosis     Bronchitis (QLJ07-IG J20.8).     Course:  Worsening.    Orders     Orders   Charges (Evaluation and Management):  21473 office outpatient visit 15 minutes (Charge) (Order): Quantity: 1, Bronchitis.     Orders (Selected)   Prescriptions  Prescribed  codeine-guaifenesin 10 mg-100 mg/5 mL oral syrup: 10 mL, PO, q4hr, PRN: for cough, # 240 mL, 0 Refill(s), Type: Maintenance, Pharmacy: [facility], 10 mL po q4 hrs,PRN:for cough  doxycycline hyclate 100 mg oral tablet: 1 tab(s) ( 100 mg ), PO, bid, # 60 tab(s), 0 Refill(s), Type: Maintenance, Pharmacy: [facility], 1 tab(s) po bid,x30 day(s).        Visit Information      Date of Service: 2017 02:41 pm  Performing Location: [facility]  Encounter#: [id number]      Primary Care Provider (PCP):  Karson Santana MD    NPI# [id number]   Visit type:  New symptom.    Accompanied by:  No one.    Source of history:  Self.    History limitation:  None.       Chief Complaint   Chief complaint discussed and confirmed correct.     2017 2:44 PM CST    Pt here for cough and congestion        History of Present Illness             The patient presents with cough.  The cough is described as paroxysmal and productive yellow sputum.  The severity of the cough is severe.  The cough is worsening.  The cough has lasted for 3 week(s).  The context of the cough: occurred in association with illness.  There are no modifying factors.  Associated symptoms consist of sore throat.        Review of Systems   Constitutional:  Negative.    Eye:  Negative.    Ear/Nose/Mouth/Throat:  Negative.    Respiratory:  Cough.    Cardiovascular:  Negative.    Gastrointestinal:  Negative.    Genitourinary:  Negative.     Hematology/Lymphatics:  Negative.    Endocrine:  Negative.    Immunologic:  Negative.    Musculoskeletal:  Negative.    Integumentary:  Negative.    Neurologic:  Negative.    Psychiatric:  Negative.          All other systems reviewed and negative      Health Status   Allergies:    Allergic Reactions (Selected)  Severity Not Documented  Dilantin (Rash)   Medications:  (Selected)   Prescriptions  Prescribed  Allegra 180 mg oral tablet: 1 tab(s) ( 180 mg ), po, daily, # 30 tab(s), 11 Refill(s), Type: Maintenance, Pharmacy: [facility], 1 tab(s) po daily  ProAir HFA 90 mcg/inh inhalation aerosol: See Instructions, Instructions: INHALE TWO PUFFS BY MOUTH FOUR TIMES A DAY AS NEEDED FOR WHEEZING OF FOR SHORTNESS OF BREATH, # 8.5 gm, 1 Refill(s), Type: Soft Stop, Pharmacy: [facility], INHALE TWO PUFFS BY MOUTH FOUR TIMES A DAY AS NEEDED FOR...  Singulair 10 mg oral tablet: 1 tab(s) ( 10 mg ), PO, qPM, PRN: for allergy symptoms, # 30 tab(s), 11 Refill(s), Type: Maintenance, Pharmacy: [facility], 1 tab(s) po qpm,PRN:for allergy symptoms  Sudafed 12-Hour 120 mg oral tablet, extended release: 1 tab(s) ( 120 mg ), po, q12 hrs, Instructions: may substitute 24 hour formulation, # 60 tab(s), 11 Refill(s), Type: Maintenance, Pharmacy: [facility], 1 tab(s) po q12 hrs,Instr:may substitute 24 hour formulation  alfuzosin 10 mg oral tablet, extended release: 1 tab(s) ( 10 mg ), po, daily, # 30 tab(s), 5 Refill(s), Type: Maintenance, Pharmacy: [facility], 1 tab(s) po daily  codeine-guaifenesin 10 mg-100 mg/5 mL oral syrup: 10 mL, PO, q4hr, PRN: for cough, # 240 mL, 0 Refill(s), Type: Maintenance, Pharmacy: [facility], 10 mL po q4 hrs,PRN:for cough  cyclobenzaprine 10 mg oral tablet: 1 tab(s), PO, q6hr, PRN: for spasm, # 30 tab(s), 5 Refill(s), Type: Maintenance, Pharmacy: [facility], 1 tab(s) po q6 hr,PRN:for spasm  doxycycline hyclate 100 mg oral tablet: 1 tab(s) ( 100 mg ), PO, bid, # 60  tab(s), 0 Refill(s), Type: Maintenance, Pharmacy: [facility], 1 tab(s) po bid,x30 day(s)  furosemide 20 mg oral tablet: 1 tab(s) ( 20 mg ), PO, Daily, PRN: for leg swelling, # 30 tab(s), 11 Refill(s), Type: Maintenance, Pharmacy: [facility], 1 tab(s) po daily,PRN:for leg swelling  methylphenidate 20 mg oral tablet: 1 tab(s) ( 20 mg ), PO, daily, # 30 tab(s), 0 Refill(s), Type: Maintenance, Pharmacy: [facility], 1 tab(s) po daily  omeprazole 20 mg oral delayed release capsule: 1 cap(s) ( 20 mg ), po, bid, # 60 cap(s), 11 Refill(s), Type: Maintenance, Pharmacy: [facility], 1 cap(s) po bid   Problem list:    All Problems  Acne / ICD-9-.1 / Confirmed  ADD (attention deficit disorder) / SNOMED CT 4231964958 / Confirmed  Allergic Rhinitis / ICD-9-.9 / Confirmed  Asthma / ICD-9-.90 / Confirmed  BPH (benign prostatic hypertrophy) / SNOMED CT 329825944 / Confirmed  Chronic Lumbar Pain / ICD-9-.2 / Confirmed  CTS (Carpal Tunnel Syndrome) / ICD-9-.0 / Confirmed  External Hemorrhoids / ICD-9-.3 / Confirmed  GERD (gastroesophageal reflux disease) / SNOMED CT 636579840 / Confirmed  Hyperlipidemia / SNOMED CT 06752728 / Confirmed  Increased BMI / ICD-9-.9 / Confirmed  Lower leg edema / SNOMED CT 359781045 / Confirmed  Obesity / ICD-9-.00 / Probable  Onychomycosis / ICD-9-.1 / Confirmed  Osteoarthritis of Knee / ICD-9-.96 / Confirmed  Stasis Dermatitis / ICD-9-.1 / Confirmed  Canceled: Hyperlipemia / ICD-9-.4      Histories   Past Medical History:    Active  Onychomycosis (110.1): Onset on 8/27/2008 at 43 years.  Comments:  2/8/2010 CST 10:46 AM CST - Barnett CMA, More[place]  left small nail  Osteoarthritis of Knee (715.96)  Chronic Lumbar Pain (724.2)  Allergic Rhinitis (477.9)  Asthma (493.90)  External Hemorrhoids (455.3)  Stasis Dermatitis (454.1)  Acne (706.1)  CTS (Carpal Tunnel Syndrome) (354.0)  Increased BMI (783.9)   Family History:     Hypertension  Father ()  Heart disease  Father ()     Procedure history:    No active procedure history items have been selected or recorded.   Social History:        Alcohol Assessment: Current            Current, 1-2 times per week      Tobacco Assessment: Denies Tobacco Use      Exercise and Physical Activity Assessment: Occasional exercise        Physical Examination   Vital signs reviewed  and within acceptable limits    Vital Signs   2017 2:44 PM CST Temperature Tympanic 97.2 DegF  LOW    Peripheral Pulse Rate 64 bpm    Systolic Blood Pressure 120 mmHg    Diastolic Blood Pressure 80 mmHg    Mean Arterial Pressure 93 mmHg    BP Site Right arm    Oxygen Saturation 99 %      Measurements from flowsheet : Measurements   2017 2:44 PM CST Height Measured - Standard 69.75 in    Weight Measured - Standard 256 lb    BSA 2.39 m2    Body Mass Index 36.99 kg/m2      General:  Alert and oriented, No acute distress.    Eye:  Pupils are equal, round and reactive to light, Extraocular movements are intact, Normal conjunctiva.    HENT:  Normocephalic, Tympanic membranes are clear, Normal hearing, Oral mucosa is moist, No pharyngeal erythema, No sinus tenderness.    Neck:  Supple, Non-tender, No lymphadenopathy, No thyromegaly.    Respiratory:  Lungs are clear to auscultation, Respirations are non-labored, Breath sounds are equal, demonstrates frequent loose cough.    Cardiovascular:  Normal rate, Regular rhythm, No murmur, Normal peripheral perfusion, No edema.    Integumentary:  Warm, Dry, Pink.    Psychiatric:  Cooperative, Appropriate mood & affect, Normal judgment.

## 2022-02-16 NOTE — PROGRESS NOTES
Patient:   MIMI MART            MRN: [id number]            FIN: [id number]               Age:   53 years     Sex:  Male     :  1965   Associated Diagnoses:   Left shoulder pain   Author:   Karson Santana MD      Impression and Plan   Diagnosis     Left shoulder pain (XXW43-YM M25.512).     Orders     Orders (Selected)   Outpatient Orders  Ordered  kenalog injection: 40 mg, Intra-articular, once  Completed   arthrocentesis aspir+/injection major jt/bursa (Charge): Quantity: 1, Left shoulder pain.     Counseled:  Patient, Regarding diagnosis, Regarding treatment, Regarding medications.       Procedure   Joint aspiration/ injection procedure   Date/ Time:  2018 4:51:00 PM.     Confirmed: patient, procedure, side, site, safety procedures followed.     Performed by: Karson Santana MD.     Informed consent: verbal consent by patient.     Indication: symptomatic relief.     Location: the left shoulder.     Preparation and technique: skin prep povidone iodine (Betadine), sterile preparation of site (in usual fashion, with 10 % povidone iodine), sterile needle used (size #25 gauge, length 1.5 inch, Posterior approach).     Joint injected: with  Triamcinolone (40  mg, 1.0  ml).     Procedure tolerated: well.

## 2022-02-16 NOTE — TELEPHONE ENCOUNTER
Entered by Bethany Garcia CMA on May 16, 2019 4:18:32 PM CDT  ---------------------  From: Bethany Garcia CMA   To: [facility]    Sent: 5/16/2019 4:18:32 PM CDT  Subject: Medication Management     ** Submitted: **  Order:omeprazole (omeprazole 20 mg oral delayed release capsule)  1 cap(s)  Oral  bid  Qty:  180 cap(s)        Refills:  1          Substitutions Allowed     Route To     Signed by Bethany Garcia CMA  5/16/2019 4:18:00 PM    ** Submitted: **  Complete:omeprazole (omeprazole 20 mg oral delayed release capsule)   Signed by Bethany Garcia CMA  5/16/2019 4:18:00 PM    ** Not Approved:  **  omeprazole (OMEPRAZOLE 20MG CPDR)  TAKE ONE CAPSULE BY MOUTH TWICE A DAY  Qty:  60 cap(s)        Days Supply:  30        Refills:  11          Substitutions Allowed     Route To    Signed by Bethany Garcia CMA            ------------------------------------------  From: [facility]  To: Karson Santana MD  Sent: May 16, 2019 4:10:03 PM CDT  Subject: Medication Management  Due: May 17, 2019 4:10:03 PM CDT    ** On Hold Pending Signature **  Drug: omeprazole (omeprazole 20 mg oral delayed release capsule)  TAKE ONE CAPSULE BY MOUTH TWICE A DAY  Quantity: 60 cap(s)     Days Supply: 30        Refills: 11  Substitutions Allowed  Notes from Pharmacy:     Dispensed Drug: omeprazole (omeprazole 20 mg oral delayed release capsule)  TAKE ONE CAPSULE BY MOUTH TWICE A DAY  Quantity: 60 cap(s)     Days Supply: 30        Refills: 11  Substitutions Allowed  Notes from Pharmacy:   ------------------------------------------

## 2022-02-16 NOTE — PROGRESS NOTES
Patient:   MIMI MART            MRN: [id number]            FIN: [id number]               Age:   52 years     Sex:  Male     :  1965   Associated Diagnoses:   Well adult exam   Author:   Karson Santana MD      Impression and Plan   Diagnosis     Well adult exam (ZHR88-AX Z00.00).     Plan:  Eye exam yearly: UTD  Dental exam yearly: UTD  Hearing exam yearly: UTD  Depression Screening yearly: UTD  Prostate screen male every year: ordered  Colorectal yearly (every ten years colonoscopy): ordered  Influenza vaccine yearly: recommended  Adacel every ten years: UTD  Lipid screen every five years: ordered  Fasting Glucose every five years: ordered.    Orders     Orders   Charges (Evaluation and Management):  26687 periodic preventive med est patient 40-64yrs (Charge) (Order): Quantity: 1, Well adult exam.     Orders (Selected)   Outpatient Orders  Ordered  Colonoscopy (Request): Cancer screening  Referral (Request): 17 10:01:00 CDT, Referred to: Cardiology, Referred to: Dr. Thomas, Claud[place]  Ordered (Dispatched)  CBC (h/h, RBC, indices, WBC, Plt)* ([facility]): Specimen Type: Blood, Collection Date: 17 9:56:00 CDT  Comprehensive Metabolic Panel* ([facility]): Specimen Type: Serum, Collection Date: 17 9:56:00 CDT  Lipid panel with reflex to direct ldl* ([facility]): Specimen Type: Serum, Collection Date: 17 9:56:00 CDT  PSA, Total (Room)* ([facility]): Specimen Type: Serum, Collection Date: 17 9:59:00 CDT  TSH* ([facility]): Specimen Type: Serum, Collection Date: 17 9:56:00 CDT  Testosterone, Total (Males)* ([facility]): Specimen Type: Serum, Collection Date: 17 9:56:00 CDT.     Summary:  Polysomnogram recommended due to chronic fatigue.       Visit Information      Date of Service: 2017 09:20 am  Performing Location: [facility]  Encounter#: [id number]      Primary Care Provider (PCP):  Karson Santana MD    NPI# [id number]      Referring Provider:  Karson Santana MD    NPI#  [id number]   Visit type:  Annual exam.    Accompanied by:  No one.    Source of history:  Self.    History limitation:  None.       Chief Complaint   Chief complaint discussed and confirmed correct.     7/6/2017 9:37 AM CDT     Physical        Well Adult History   Well Adult History             The patient presents for well adult exam.  The patient's general health status is described as fair.  The patient's diet is described as balanced.  Exercise: occasional.  Associated symptoms consist of weight gain and fatigue.  Medical encounters: none.  Compliance problems: none.        Review of Systems   Constitutional:  Fatigue, Decreased activity, over weight.    Eye:  Negative.    Ear/Nose/Mouth/Throat:  Negative.    Respiratory:  Negative.    Cardiovascular:  Negative.    Gastrointestinal:  Negative.    Genitourinary:  Negative, decreased libido.    Hematology/Lymphatics:  Negative.    Endocrine:  Negative.    Immunologic:  Negative.    Musculoskeletal:  Joint pain.    Integumentary:  Negative, acne lesion of scalp.    Neurologic:  Negative.    Psychiatric:  Negative.    All other systems reviewed and negative      Health Status   Allergies:    Allergic Reactions (Selected)  Severity Not Documented  Dilantin (Rash)   Medications:  (Selected)   Prescriptions  Prescribed  Allegra 180 mg oral tablet: 1 tab(s) ( 180 mg ), po, daily, # 30 tab(s), 11 Refill(s), Type: Maintenance, Pharmacy: [facility], 1 tab(s) po daily  ProAir HFA 90 mcg/inh inhalation aerosol: See Instructions, Instructions: INHALE TWO PUFFS BY MOUTH FOUR TIMES A DAY AS NEEDED FOR WHEEZING OF FOR SHORTNESS OF BREATH, # 8.5 gm, 1 Refill(s), Type: Soft Stop, Pharmacy: [facility], INHALE TWO PUFFS BY MOUTH FOUR TIMES A DAY AS NEEDED FOR...  Singulair 10 mg oral tablet: 1 tab(s) ( 10 mg ), PO, qPM, PRN: for allergy symptoms, # 30 tab(s), 11 Refill(s), Type: Maintenance, Pharmacy: [facility], 1 tab(s) po qpm,PRN:for allergy symptoms  Sudafed  12-Hour 120 mg oral tablet, extended release: 1 tab(s) ( 120 mg ), po, q12 hrs, Instructions: may substitute 24 hour formulation, # 60 tab(s), 11 Refill(s), Type: Maintenance, Pharmacy: [facility], 1 tab(s) po q12 hrs,Instr:may substitute 24 hour formulation  alfuzosin 10 mg oral tablet, extended release: 1 tab(s) ( 10 mg ), po, daily, # 30 tab(s), 11 Refill(s), Type: Maintenance, Pharmacy: [facility], 1 tab(s) po daily  cyclobenzaprine 10 mg oral tablet: 1 tab(s), PO, q6hr, PRN: for spasm, # 30 tab(s), 5 Refill(s), Type: Maintenance, Pharmacy: [facility], 1 tab(s) po q6 hr,PRN:for spasm  furosemide 20 mg oral tablet: 1 tab(s) ( 20 mg ), PO, Daily, PRN: for leg swelling, # 30 tab(s), 11 Refill(s), Type: Maintenance, Pharmacy: [facility], 1 tab(s) po daily,PRN:for leg swelling  methylphenidate 20 mg oral tablet: 1 tab(s) ( 20 mg ), PO, daily, # 30 tab(s), 0 Refill(s), Type: Maintenance  omeprazole 20 mg oral delayed release capsule: 1 cap(s) ( 20 mg ), po, bid, # 60 cap(s), 11 Refill(s), Type: Maintenance, Pharmacy: [facility], 1 cap(s) po bid   Problem list:    All Problems  Acne / ICD-9-.1 / Confirmed  ADD (attention deficit disorder) / SNOMED CT 5035967192 / Confirmed  Allergic Rhinitis / ICD-9-.9 / Confirmed  Asthma / ICD-9-.90 / Confirmed  BPH (benign prostatic hypertrophy) / SNOMED CT 729089059 / Confirmed  Chronic Lumbar Pain / ICD-9-.2 / Confirmed  CTS (Carpal Tunnel Syndrome) / ICD-9-.0 / Confirmed  External Hemorrhoids / ICD-9-.3 / Confirmed  GERD (gastroesophageal reflux disease) / SNOMED CT 379099121 / Confirmed  Hyperlipidemia / SNOMED CT 22889121 / Confirmed  Increased BMI / ICD-9-.9 / Confirmed  Lower leg edema / SNOMED CT 417383064 / Confirmed  Obesity / ICD-9-.00 / Probable  Onychomycosis / ICD-9-.1 / Confirmed  Osteoarthritis of Knee / ICD-9-.96 / Confirmed  Stasis Dermatitis / ICD-9-.1 /  Confirmed  Canceled: Hyperlipemia / ICD-9-.4      Histories   Past Medical History:    Active  Onychomycosis (110.1): Onset on 2008 at 43 years.  Comments:  2010 CST 10:46 AM CST - Barnett Morena BLAKE  left small nail  Osteoarthritis of Knee (715.96)  Chronic Lumbar Pain (724.2)  Allergic Rhinitis (477.9)  Asthma (493.90)  External Hemorrhoids (455.3)  Stasis Dermatitis (454.1)  Acne (706.1)  CTS (Carpal Tunnel Syndrome) (354.0)  Increased BMI (783.9)   Family History:    Hypertension  Father ()  Heart disease  Father ()     Procedure history:    No active procedure history items have been selected or recorded.   Social History:        Alcohol Assessment: Current            Current, 1-2 times per week      Tobacco Assessment: Denies Tobacco Use      Exercise and Physical Activity Assessment: Occasional exercise        Physical Examination   Vital signs reviewed  and within acceptable limits    Vital Signs   2017 9:37 AM CDT Temperature Tympanic 98.0 DegF    Peripheral Pulse Rate 68 bpm    Systolic Blood Pressure 116 mmHg    Diastolic Blood Pressure 72 mmHg    Mean Arterial Pressure 87 mmHg      Measurements from flowsheet : Measurements   2017 9:37 AM CDT Height Measured - Standard 69 in    Weight Measured - Standard 250.4 lb    BSA 2.35 m2    Body Mass Index 36.97 kg/m2      General:  Alert and oriented, No acute distress.    Eye:  Pupils are equal, round and reactive to light, Extraocular movements are intact, Normal conjunctiva.    HENT:  Normocephalic, Tympanic membranes are clear, Normal hearing, Oral mucosa is moist, No pharyngeal erythema.    Neck:  Supple, Non-tender, No carotid bruit, No jugular venous distention, No lymphadenopathy, No thyromegaly.    Respiratory:  Lungs are clear to auscultation, Respirations are non-labored, Breath sounds are equal, Symmetrical chest wall expansion, No chest wall tenderness.    Cardiovascular:  Normal rate, Regular rhythm, No murmur,  No gallop, Good pulses equal in all extremities, Normal peripheral perfusion, No edema.    Gastrointestinal:  Soft, Non-tender, Non-distended, Normal bowel sounds, No organomegaly.    Lymphatics:  No lymphadenopathy neck, axilla, groin.    Musculoskeletal:  Normal range of motion, Normal strength, No tenderness, No swelling, No deformity, Normal gait.    Integumentary:  Warm, Dry, Pink.    Neurologic:  Normal sensory, Normal motor function, No focal deficits, Cranial Nerves II-XII are grossly intact, Normal deep tendon reflexes.    Psychiatric:  Cooperative, Appropriate mood & affect, Normal judgment.

## 2022-02-16 NOTE — LETTER
(Inserted Image. Unable to display)   [address] [facility] [address]  November 04, 2019      MIMI MART  [address] [place] [phone]        Dear MIMI,     Thank you for selecting [facility] for your healthcare needs. Below you will find the results of the recent tests done at our clinic.      The Clostridium, Helicobacter, Cryptosporidium and Giardia were all NEGATIVE.  The rest of the tests are still being processed.      Result Name Current Result   Clostridium difficile Toxin  See comment 10/31/2019   Helicobacter pylori Ag Stool  See comment 10/31/2019   Culture Campylobacter  See comment 10/31/2019   Culture Salmonella/Shigella  See comment 10/31/2019   Shiga Toxin  See comment 10/31/2019   Trichrome 1  See comment 10/31/2019   Concentrate 1  See comment 10/31/2019   Cryptosporidium Ag  See comment 10/31/2019   Giardia Ag  See comment 10/31/2019       Please contact my practice at [phone]  if you have any questions or concerns.     Sincerely,        Karson Santana MD          What do your labs mean?  Below is a glossary of commonly ordered labs:  LDL   Bad Cholesterol   HDL   Good Cholesterol  AST/ALT   Liver Function   Cr/Creatinine   Kidney Function  Microalbumin   Kidney Function  BUN   Kidney Function  PSA   Prostate    TSH   Thyroid Hormone  HgbA1c   Diabetes Test   Hgb (Hemoglobin)   Red Blood Cells

## 2022-02-16 NOTE — NURSING NOTE
Comprehensive Intake Entered On:  12/29/2020 11:16 AM CST    Performed On:  12/29/2020 11:11 AM CST by Horace Dean CMA               Summary   Chief Complaint :   Verbal consent given for video visit.  Pt is c/o sinus pressure x month.  Pt states he had positive COVID test 11/26/20.  Pt also requesting refill on his omeprazole to [facility] pharmacy in [place].   Height Measured :   69 in(Converted to: 5 ft 9 in, 175.26 cm)    Horace Dean CMA - 12/29/2020 11:11 AM CST   Health Status   Allergies Verified? :   Yes   Medication History Verified? :   Yes   Medical History Verified? :   Yes   Pre-Visit Planning Status :   Not completed   Tobacco Use? :   Never smoker   Horace Dean CMA - 12/29/2020 11:11 AM CST   Meds / Allergies   (As Of: 12/29/2020 11:16:16 AM CST)   Allergies (Active)   Dilantin  Estimated Onset Date:   Unspecified ; Reactions:   rash ; Created By:   Morena Barnett; Reaction Status:   Active ; Category:   Drug ; Substance:   Dilantin ; Type:   Allergy ; Updated By:   Morena Barnett; Reviewed Date:   6/16/2020 2:40 PM CDT        Medication List   (As Of: 12/29/2020 11:16:16 AM CST)   Prescription/Discharge Order    methylphenidate  :   methylphenidate ; Status:   Prescribed ; Ordered As Mnemonic:   methylphenidate 20 mg oral tablet ; Simple Display Line:   1 tab(s), Oral, daily, 30 tab(s), 0 Refill(s) ; Ordering Provider:   Karson Santana MD; Catalog Code:   methylphenidate ; Order Dt/Tm:   11/11/2020 12:10:42 PM CST          alfuzosin  :   alfuzosin ; Status:   Prescribed ; Ordered As Mnemonic:   alfuzosin 10 mg oral tablet, extended release ; Simple Display Line:   1 tab(s), Oral, daily, 90 tab(s), 1 Refill(s) ; Ordering Provider:   Karson Santana MD; Catalog Code:   alfuzosin ; Order Dt/Tm:   6/16/2020 2:56:59 PM CDT          montelukast  :   montelukast ; Status:   Prescribed ; Ordered As Mnemonic:   montelukast 10 mg oral tablet ; Simple Display Line:   1 tab(s), Oral, daily, 90 tab(s), 1  Refill(s) ; Ordering Provider:   Karson Santana MD; Catalog Code:   montelukast ; Order Dt/Tm:   6/16/2020 2:56:51 PM CDT          omeprazole  :   omeprazole ; Status:   Prescribed ; Ordered As Mnemonic:   omeprazole 20 mg oral delayed release capsule ; Simple Display Line:   1 cap(s), Oral, bid, 180 cap(s), 1 Refill(s) ; Ordering Provider:   Karson Santana MD; Catalog Code:   omeprazole ; Order Dt/Tm:   6/16/2020 2:56:26 PM CDT          cyclobenzaprine  :   cyclobenzaprine ; Status:   Prescribed ; Ordered As Mnemonic:   cyclobenzaprine 10 mg oral tablet ; Simple Display Line:   1 tab(s), PO, q6hr, PRN: for spasm, 30 tab(s), 5 Refill(s) ; Ordering Provider:   Karson Santana MD; Catalog Code:   cyclobenzaprine ; Order Dt/Tm:   10/28/2019 9:39:12 AM CDT          desoximetasone topical  :   desoximetasone topical ; Status:   Prescribed ; Ordered As Mnemonic:   desoximetasone 0.05% topical cream ; Simple Display Line:   1 jeff, TOP, BID, 60 g, 1 Refill(s) ; Ordering Provider:   Karson Santana MD; Catalog Code:   desoximetasone topical ; Order Dt/Tm:   5/22/2019 4:39:13 PM CDT          albuterol  :   albuterol ; Status:   Prescribed ; Ordered As Mnemonic:   ProAir HFA 90 mcg/inh inhalation aerosol ; Simple Display Line:   See Instructions, INHALE TWO PUFFS BY MOUTH FOUR TIMES A DAY AS NEEDED FOR WHEEZING OF FOR SHORTNESS OF BREATH, 8.5 gm, 1 Refill(s) ; Ordering Provider:   Karson Santana MD; Catalog Code:   albuterol ; Order Dt/Tm:   5/22/2019 4:39:06 PM CDT          furosemide  :   furosemide ; Status:   Prescribed ; Ordered As Mnemonic:   furosemide 20 mg oral tablet ; Simple Display Line:   20 mg, 1 tab(s), PO, Daily, PRN: for leg swelling, 90 tab(s), 1 Refill(s) ; Ordering Provider:   Karson Santana MD; Catalog Code:   furosemide ; Order Dt/Tm:   5/22/2019 4:38:52 PM CDT            Home Meds    loratadine  :   loratadine ; Status:   Documented ; Ordered As Mnemonic:   loratadine 10 mg oral tablet ; Simple Display  Line:   10 mg, 1 tab(s), Oral, daily, 0 Refill(s) ; Catalog Code:   loratadine ; Order Dt/Tm:   8/27/2020 3:59:04 PM CDT            ID Risk Screen   Recent Travel History :   No recent travel   Family Member Travel History :   No recent travel   Other Exposure to Infectious Disease :   Unknown   COVID-19 Testing Status :   Positive COVID-19 test greater than 30 days ago   Location most recent positive COVID test :   11/26/20   Horace Dean CMA - 12/29/2020 11:11 AM CST   Social History   Social History   (As Of: 12/29/2020 11:16:16 AM CST)   Alcohol:  Current      Current, 1-2 times per week   (Last Updated: 4/12/2010 2:23:35 PM CDT by Morena Barnett CMA)          Tobacco:  Denies Tobacco Use      Never (less than 100 in lifetime)   (Last Updated: 12/29/2020 11:13:06 AM CST by Horace Dean CMA)          Electronic Cigarette/Vaping:        Electronic Cigarette Use: Never.   (Last Updated: 12/29/2020 11:13:10 AM CST by Horace Dean CMA)          Substance Abuse:  Denies Substance Abuse      (Last Updated: 5/9/2018 1:24:28 PM CDT by Karson Santana MD )         Employment/School:        Employed   (Last Updated: 5/9/2018 1:24:35 PM CDT by Karson Santana MD)          Home/Environment:        Marital status: .  2 children.  Living situation: Home/Independent.   (Last Updated: 5/9/2018 1:24:49 PM CDT by Karson Santana MD)          Nutrition/Health:         Comments:  12/5/2018 9:51 AM - Shamika Astorga: Restrictions:  Green peppers.   (Last Updated: 12/5/2018 9:51:40 AM CST by Shamika Astorga)          Exercise:  Regular exercise      Exercise frequency: Daily.  Exercise type: work.   (Last Updated: 12/5/2018 9:52:00 AM CST by Shamika Astorga)

## 2022-02-16 NOTE — TELEPHONE ENCOUNTER
---------------------  From: Bethany Garcia CMA (eRx Pool ([id number]))   To: Karson Santana MD;     Sent: 11/11/2020 9:13:46 AM CST  Subject: FW: Medication Management   Due Date/Time: 11/11/2020 3:25:00 PM CST           ------------------------------------------  From: [facility]  To: Karson Santana MD  Sent: November 10, 2020 3:25:26 PM CST  Subject: Medication Management  Due: November 7, 2020 12:21:51 AM CST     ** On Hold Pending Signature **     Drug: methylphenidate (methylphenidate 20 mg oral tablet), TAKE ONE TABLET BY MOUTH EVERY DAY  Quantity: 30 unknown unit  Days Supply: 30  Refills: 0  Substitutions Allowed  Notes from Pharmacy:     Dispensed Drug: methylphenidate (methylphenidate 20 mg oral tablet), TAKE ONE TABLET BY MOUTH EVERY DAY  Quantity: 30 unknown unit  Days Supply: 30  Refills: 0  Substitutions Allowed  Notes from Pharmacy:  ------------------------------------------  ** Submitted: **  Order:methylphenidate (methylphenidate 20 mg oral tablet)  1 tab(s)  Oral  daily  Qty:  30 tab(s)        Refills:  0          Route To Pharmacy - [facility]    Signed by Karson Santana MD  11/11/2020 6:10:00 PM RUST

## 2022-02-16 NOTE — TELEPHONE ENCOUNTER
---------------------  From: Bethany Garcia CMA   To: Karson Santana MD;     Sent: 2/5/2020 2:09:31 PM CST  Subject: General Message     Phone Message    Note:   Pt called wanting to ask JOY a couple questions about the U/S results that he had gotten... like how advanced or how bad and other questions.          Person Calling:    Phone number:  [phone]        PCP:   _    Time of Call:  _    Last office visit and reason:  _    Transferred to: _

## 2022-02-16 NOTE — TELEPHONE ENCOUNTER
Entered by Johanne Sinclair MD on October 05, 2020 3:35:32 PM CDT  ---------------------  From: Johanen Sinclair MD   To: [facility]    Sent: 10/5/2020 3:35:32 PM CDT  Subject: Medication Management     ** Not Approved: Patient needs appointment, Due for appointment.  No refills for this medication. **  methylphenidate (METHYLPHENIDATE HCL 20MG TABS)  TAKE ONE TABLET BY MOUTH EVERY DAY  Qty:  30 tab(s)        Days Supply:  30        Refills:  0          Substitutions Allowed     Route To Pharmacy - [facility]   Signed by Johanne Sinclair MD            ------------------------------------------  From: [facility]  To: Karson Santana MD  Sent: October 5, 2020 3:17:20 PM CDT  Subject: Medication Management  Due: October 2, 2020 11:08:39 PM CDT     ** On Hold Pending Signature **     Drug: methylphenidate (methylphenidate 20 mg oral tablet), TAKE ONE TABLET BY MOUTH EVERY DAY  Quantity: 30 tab(s)  Days Supply: 30  Refills: 0  Substitutions Allowed  Notes from Pharmacy:     Dispensed Drug: methylphenidate (methylphenidate 20 mg oral tablet), TAKE ONE TABLET BY MOUTH EVERY DAY  Quantity: 30 tab(s)  Days Supply: 30  Refills: 0  Substitutions Allowed  Notes from Pharmacy:  ------------------------------------------

## 2022-02-16 NOTE — TELEPHONE ENCOUNTER
---------------------  From: Bethany Garcia CMA (eRx Pool ([id number]))   To: Karson Santana MD;     Sent: 12/30/2019 3:32:09 PM CST  Subject: FW: Medication Management   Due Date/Time: 12/31/2019 3:20:00 PM CST             ** Submitted: **  Order:montelukast (montelukast 10 mg oral tablet)  1 tab(s)  Oral  daily  Qty:  90 tab(s)        Days Supply:  90        Refills:  1          Substitutions Allowed     Route To [facility]    Signed by Bethany Garcia CMA  12/30/2019 3:31:00 PM    ** Submitted: **  Complete:montelukast (montelukast 10 mg oral tablet)   Signed by Bethany Garcia CMA  12/30/2019 3:31:00 PM    ** Not Approved:  **  montelukast (MONTELUKAST SODIUM 10MG TABS)  TAKE ONE TABLET BY MOUTH EVERY DAY  Qty:  90 tab(s)        Days Supply:  90        Refills:  1          Substitutions Allowed     Route To [facility]   Signed by Bethany Garcia CMA            ** Submitted: **  Order:alfuzosin (alfuzosin 10 mg oral tablet, extended release)  1 tab(s)  Oral  daily  Qty:  90 tab(s)        Days Supply:  90        Refills:  1          Substitutions Allowed     Route To [facility]    Signed by Bethany Garcia CMA  12/30/2019 3:31:00 PM    ** Submitted: **  Complete:alfuzosin (alfuzosin 10 mg oral tablet, extended release)   Signed by Bethany Garcia CMA  12/30/2019 3:31:00 PM    ** Not Approved:  **  alfuzosin (ALFUZOSIN HCL ER 10MG TB24)  TAKE ONE TABLET BY MOUTH EVERY DAY  Qty:  90 tab(s)        Days Supply:  90        Refills:  1          Substitutions Allowed     Route To [facility]   Signed by Bethany Garcia CMA            ------------------------------------------  From: [facility]  To: Karson Santana MD  Sent: December 30, 2019 3:20:07 PM CST  Subject: Medication Management  Due: December 31, 2019 3:20:07 PM CST    ** On Hold Pending Signature **  Drug: montelukast (montelukast 10 mg oral tablet)  TAKE ONE TABLET  BY MOUTH EVERY DAY  Quantity: 90 tab(s)  Days Supply: 90  Refills: 1  Substitutions Allowed  Notes from Pharmacy:     Dispensed Drug: montelukast (montelukast 10 mg oral tablet)  TAKE ONE TABLET BY MOUTH EVERY DAY  Quantity: 90 tab(s)  Days Supply: 90  Refills: 1  Substitutions Allowed  Notes from Pharmacy:     ** On Hold Pending Signature **  Drug: alfuzosin (alfuzosin 10 mg oral tablet, extended release)  TAKE ONE TABLET BY MOUTH EVERY DAY  Quantity: 90 tab(s)  Days Supply: 90  Refills: 1  Substitutions Allowed  Notes from Pharmacy:     Dispensed Drug: alfuzosin (alfuzosin 10 mg oral tablet, extended release)  TAKE ONE TABLET BY MOUTH EVERY DAY  Quantity: 90 tab(s)  Days Supply: 90  Refills: 1  Substitutions Allowed  Notes from Pharmacy:     ** On Hold Pending Signature **  Drug: methylphenidate (methylphenidate 20 mg oral tablet)  TAKE ONE TABLET BY MOUTH EVERY DAY  Quantity: 30 tab(s)  Days Supply: 30  Refills: 0  Substitutions Allowed  Notes from Pharmacy:     Dispensed Drug: methylphenidate (methylphenidate 20 mg oral tablet)  TAKE ONE TABLET BY MOUTH EVERY DAY  Quantity: 30 tab(s)  Days Supply: 30  Refills: 0  Substitutions Allowed  Notes from Pharmacy:   ---------------------------------------------------------------  From: Karson Santana MD   To: [facility]    Sent: 12/31/2019 10:34:26 AM CST  Subject: FW: Medication Management     ** Submitted: **  Complete:methylphenidate (methylphenidate 20 mg oral tablet)   Signed by Karson Santana MD  12/31/2019 10:34:00 AM    ** Approved **  methylphenidate (METHYLPHENIDATE HCL 20MG TABS)  TAKE ONE TABLET BY MOUTH EVERY DAY  Qty:  30 tab(s)        Days Supply:  30          Substitutions Allowed     Route To Pharmacy - [facility]

## 2022-02-16 NOTE — NURSING NOTE
Comprehensive Intake Entered On:  5/22/2019 4:19 PM CDT    Performed On:  5/22/2019 4:18 PM CDT by Bethany Garcia CMA               Summary   Systolic Blood Pressure :   128 mmHg   Diastolic Blood Pressure :   84 mmHg (HI)    Mean Arterial Pressure :   99 mmHg   Peripheral Pulse Rate :   66 bpm   Oxygen Saturation :   97 %   GarciaBethany andres CMA - 5/22/2019 4:21 PM CDT   Chief Complaint :   Pt here for allergies   Weight Measured :   242 lb(Converted to: 242 lb 0 oz, 109.77 kg)    Height Measured :   69 in(Converted to: 5 ft 9 in, 175.26 cm)    Body Mass Index :   35.73 kg/m2 (HI)    Body Surface Area :   2.31 m2   Bethany Garcia CMA - 5/22/2019 4:18 PM CDT   Health Status   Allergies Verified? :   Yes   Medication History Verified? :   Yes   Medical History Verified? :   Yes   Pre-Visit Planning Status :   Completed   Tobacco Use? :   Never smoker   Bethany Garcia CMA - 5/22/2019 4:18 PM CDT   Consents   Consent for Immunization Exchange :   Consent Granted   Consent for Immunizations to Providers :   Consent Granted   Bethany Garcia CMA - 5/22/2019 4:18 PM CDT   Meds / Allergies   (As Of: 5/22/2019 4:19:40 PM CDT)   Allergies (Active)   Dilantin  Estimated Onset Date:   Unspecified ; Reactions:   rash ; Created By:   Morena Barnett CMA; Reaction Status:   Active ; Category:   Drug ; Substance:   Dilantin ; Type:   Allergy ; Updated By:   Morena Barnett CMA; Reviewed Date:   5/22/2019 4:19 PM CDT        Medication List   (As Of: 5/22/2019 4:19:40 PM CDT)   Prescription/Discharge Order    albuterol  :   albuterol ; Status:   Prescribed ; Ordered As Mnemonic:   ProAir HFA 90 mcg/inh inhalation aerosol ; Simple Display Line:   See Instructions, INHALE TWO PUFFS BY MOUTH FOUR TIMES A DAY AS NEEDED FOR WHEEZING OF FOR SHORTNESS OF BREATH, 8.5 gm, 1 Refill(s) ; Ordering Provider:   Karson Santana MD; Catalog Code:   albuterol ; Order Dt/Tm:   12/4/2018 4:28:19 PM          alfuzosin  :   alfuzosin ; Status:    Prescribed ; Ordered As Mnemonic:   alfuzosin 10 mg oral tablet, extended release ; Simple Display Line:   10 mg, 1 tab(s), po, daily, 30 tab(s), 11 Refill(s) ; Ordering Provider:   Karson Santana MD; Catalog Code:   alfuzosin ; Order Dt/Tm:   12/4/2018 4:28:33 PM          desoximetasone topical  :   desoximetasone topical ; Status:   Prescribed ; Ordered As Mnemonic:   desoximetasone 0.05% topical cream ; Simple Display Line:   1 jeff, TOP, BID, 60 g, 1 Refill(s) ; Ordering Provider:   Karson Santana MD; Catalog Code:   desoximetasone topical ; Order Dt/Tm:   12/4/2018 4:05:53 PM          furosemide  :   furosemide ; Status:   Prescribed ; Ordered As Mnemonic:   furosemide 20 mg oral tablet ; Simple Display Line:   20 mg, 1 tab(s), PO, Daily, PRN: for leg swelling, 30 tab(s), 11 Refill(s) ; Ordering Provider:   Karson Santana MD; Catalog Code:   furosemide ; Order Dt/Tm:   12/4/2018 4:28:25 PM          methylphenidate  :   methylphenidate ; Status:   Prescribed ; Ordered As Mnemonic:   methylphenidate 20 mg oral tablet ; Simple Display Line:   1 tab(s), Oral, daily, 30 tab(s), 0 Refill(s) ; Ordering Provider:   Karson Santana MD; Catalog Code:   methylphenidate ; Order Dt/Tm:   5/20/2019 11:22:58 AM          montelukast  :   montelukast ; Status:   Prescribed ; Ordered As Mnemonic:   montelukast 10 mg oral tablet ; Simple Display Line:   10 mg, 1 tab(s), Oral, daily, 30 tab(s), 11 Refill(s) ; Ordering Provider:   Karson Santana MD; Catalog Code:   montelukast ; Order Dt/Tm:   12/4/2018 4:28:51 PM          omeprazole  :   omeprazole ; Status:   Prescribed ; Ordered As Mnemonic:   omeprazole 20 mg oral delayed release capsule ; Simple Display Line:   1 cap(s), Oral, bid, 180 cap(s), 1 Refill(s) ; Ordering Provider:   Karson Santana MD; Catalog Code:   omeprazole ; Order Dt/Tm:   5/16/2019 4:18:02 PM

## 2022-02-16 NOTE — TELEPHONE ENCOUNTER
Entered by Bethany Garcia CMA on April 06, 2020 8:15:27 AM CDT  ---------------------  From: Bethany Garcia CMA   To: [facility]    Sent: 4/6/2020 8:15:27 AM CDT  Subject: Medication Management     ** Submitted: **  Order:omeprazole (omeprazole 20 mg oral delayed release capsule)  1 cap(s)  Oral  bid  Qty:  180 cap(s)        Days Supply:  90        Refills:  1          Substitutions Allowed     Route To [facility]    Signed by Bethany Garcia CMA  4/6/2020 1:15:00 PM    ** Submitted: **  Complete:omeprazole (omeprazole 20 mg oral delayed release capsule)   Signed by Bethany Garcia CMA  4/6/2020 1:15:00 PM    ** Not Approved:  **  omeprazole (OMEPRAZOLE 20MG CPDR)  TAKE ONE CAPSULE TWO TIMES A DAY  Qty:  180 cap(s)        Days Supply:  90        Refills:  1          Substitutions Allowed     Route To [facility]   Signed by Bethany Garcia CMA            ------------------------------------------  From: [facility]  To: Karson Santana MD  Sent: April 3, 2020 3:07:38 PM CDT  Subject: Medication Management  Due: April 4, 2020 3:07:38 PM CDT    ** On Hold Pending Signature **  Drug: omeprazole (omeprazole 20 mg oral delayed release capsule)  TAKE ONE CAPSULE TWO TIMES A DAY  Quantity: 180 cap(s)  Days Supply: 90  Refills: 1  Substitutions Allowed  Notes from Pharmacy:     Dispensed Drug: omeprazole (omeprazole 20 mg oral delayed release capsule)  TAKE ONE CAPSULE TWO TIMES A DAY  Quantity: 180 cap(s)  Days Supply: 90  Refills: 1  Substitutions Allowed  Notes from Pharmacy:   ------------------------------------------

## 2022-02-16 NOTE — PROGRESS NOTES
Patient:   MIMI MART            MRN: [id number]            FIN: [id number]               Age:   53 years     Sex:  Male     :  1965   Associated Diagnoses:   ADD (attention deficit disorder); GERD (gastroesophageal reflux disease); Allergic Rhinitis; Asthma; BPH (benign prostatic hypertrophy)   Author:   Karson Santana MD      Impression and Plan   Diagnosis     ADD (attention deficit disorder) (VUR75-SY F98.8).     Course:  Progressing as expected.    Orders     Orders   Charges (Evaluation and Management):  97332 office outpatient visit 15 minutes (Charge) (Order): Quantity: 1, Hyperlipidemia  Hemorrhoids  Obesity  ADD (attention deficit disorder)  BPH (benign prostatic hypertrophy).     Orders (Selected)   Prescriptions  Prescribed  methylphenidate 20 mg oral tablet: = 1 tab(s) ( 20 mg ), PO, daily, # 30 tab(s), 0 Refill(s), Type: Maintenance, Pharmacy: [facility], 1 tab(s) Oral daily.     Diagnosis     GERD (gastroesophageal reflux disease) (YFS11-OM K21.9).     Course:  Progressing as expected.    Orders     Orders (Selected)   Prescriptions  Prescribed  omeprazole 20 mg oral delayed release capsule: = 1 cap(s) ( 20 mg ), po, daily, # 30 cap(s), 11 Refill(s), Type: Maintenance, Pharmacy: [facility], 1 cap(s) Oral daily.     Diagnosis     Allergic Rhinitis (PLC22-HE J30.1).     Asthma (FRC20-ZI J45.909).     Course:  Progressing as expected.    Orders     Orders (Selected)   Prescriptions  Prescribed  ProAir HFA 90 mcg/inh inhalation aerosol: See Instructions, Instructions: INHALE TWO PUFFS BY MOUTH FOUR TIMES A DAY AS NEEDED FOR WHEEZING OF FOR SHORTNESS OF BREATH, # 8.5 gm, 1 Refill(s), Type: Soft Stop, Pharmacy: [facility], INHALE TWO PUFFS BY MOUTH FOUR TIMES A DAY AS NEEDED FOR...  montelukast 10 mg oral tablet: = 1 tab(s) ( 10 mg ), Oral, daily, # 30 tab(s), 11 Refill(s), Type: Maintenance, Pharmacy: [facility], 1 tab(s) Oral daily.     Diagnosis     BPH (benign  prostatic hypertrophy) (ZWN83-TK N40.0).     Orders     Orders (Selected)   Prescriptions  Prescribed  alfuzosin 10 mg oral tablet, extended release: = 1 tab(s) ( 10 mg ), po, daily, # 30 tab(s), 11 Refill(s), Type: Maintenance, Pharmacy: [facility], 1 tab(s) Oral daily.        Visit Information      Date of Service: 12/04/2018 03:48 pm  Performing Location: [facility]  Encounter#: [id number]      Primary Care Provider (PCP):  Karson Santana MD# [id number]      Referring Provider:  Karson Santana MD# [id number]   Visit type:  Scheduled follow-up.    Accompanied by:  No one.    Source of history:  Self.    Referral source:  Self.    History limitation:  None.       Chief Complaint   12/4/2018 3:57 PM CST    Pt here for px        History of Present Illness             The patient presents Attention Deficit Discorder.  There are no modifying factors.  Associated symptoms consist of denies decrease in social interaction, denies fatigue, denies impaired judgement, denies insomnia, denies loss of appetite and denies weight loss.  Additional pertinent history: none.               The patient presents with rhinorrhea.  The rhinorrhea is from both nostrils.  The rhinorrhea is described as clear.  The severity of the rhinorrhea is severe.  The rhinorrhea is constant and is worsening.  The context of the rhinorrhea: occurred after exposure to allergens.  Exacerbating factors consist of pollen.  Relieving factors consist of allergen avoidance and medication.  Associated symptoms consist of itchy eyes, nasal congestion, sneezing, denies fever, denies headache, denies cough, denies ear pain, denies facial pain and denies sore throat.        Interval History   Reflux Esophagitis   The course is progressing as expected.  Compliance problems: none.  The effect on daily activities is no change in eating habits, no change in sleeping patterns and no change in work/school duties.  There were disease  related encounters including none.  Associated symptoms characterized by no weight loss, chest pain, wheeze, epigastric pain, hematemesis, loss of appetite or nausea.        Review of Systems   Constitutional:  Fatigue.    Eye:  Negative.    Ear/Nose/Mouth/Throat:  Negative.    Respiratory:  Negative.    Cardiovascular:  Negative.    Gastrointestinal:  Negative.    Genitourinary:  Negative.    Hematology/Lymphatics:  Negative.    Endocrine:  Negative.    Immunologic:  Negative.    Musculoskeletal:  Negative.    Integumentary:  Negative.    Neurologic:  Negative.    Psychiatric:  Negative.    All other systems reviewed and negative      Health Status   Allergies:    Allergic Reactions (Selected)  Severity Not Documented  Dilantin (Rash)   Medications:  (Selected)   Prescriptions  Prescribed  ProAir HFA 90 mcg/inh inhalation aerosol: See Instructions, Instructions: INHALE TWO PUFFS BY MOUTH FOUR TIMES A DAY AS NEEDED FOR WHEEZING OF FOR SHORTNESS OF BREATH, # 8.5 gm, 1 Refill(s), Type: Soft Stop, Pharmacy: [facility], INHALE TWO PUFFS BY MOUTH FOUR TIMES A DAY AS NEEDED FOR...  alfuzosin 10 mg oral tablet, extended release: = 1 tab(s) ( 10 mg ), po, daily, # 30 tab(s), 11 Refill(s), Type: Maintenance, Pharmacy: [facility], 1 tab(s) Oral daily  desoximetasone 0.05% topical cream: 1 jeff, TOP, BID, # 60 g, 1 Refill(s), Type: Maintenance, Pharmacy: [facility], 1 jeff Topical bid  furosemide 20 mg oral tablet: = 1 tab(s) ( 20 mg ), PO, Daily, PRN: for leg swelling, # 30 tab(s), 11 Refill(s), Type: Maintenance, Pharmacy: [facility], 1 tab(s) Oral daily,PRN:for leg swelling  methylphenidate 20 mg oral tablet: = 1 tab(s) ( 20 mg ), PO, daily, # 30 tab(s), 0 Refill(s), Type: Maintenance, Pharmacy: [facility], 1 tab(s) Oral daily  montelukast 10 mg oral tablet: = 1 tab(s) ( 10 mg ), Oral, daily, # 30 tab(s), 11 Refill(s), Type: Maintenance, Pharmacy: [facility], 1 tab(s) Oral daily  omeprazole  20 mg oral delayed release capsule: = 1 cap(s) ( 20 mg ), po, daily, # 30 cap(s), 11 Refill(s), Type: Maintenance, Pharmacy: [facility], 1 cap(s) Oral daily   Problem list:    All Problems  Acne / ICD-9-.1 / Confirmed  ADD (attention deficit disorder) / SNOMED CT 7351820643 / Confirmed  Allergic Rhinitis / ICD-9-.9 / Confirmed  Asthma / ICD-9-.90 / Confirmed  BPH (benign prostatic hypertrophy) / SNOMED CT 358321808 / Confirmed  Chronic Lumbar Pain / ICD-9-.2 / Confirmed  CTS (Carpal Tunnel Syndrome) / ICD-9-.0 / Confirmed  External Hemorrhoids / ICD-9-.3 / Confirmed  GERD (gastroesophageal reflux disease) / SNOMED CT 459515064 / Confirmed  Hyperlipidemia / SNOMED CT 84094473 / Confirmed  Increased BMI / ICD-9-.9 / Confirmed  Low testosterone / SNOMED CT 9652767168 / Confirmed  Lower leg edema / SNOMED CT 518256510 / Confirmed  Male hypogonadism / SNOMED CT 96481162 / Confirmed  Obesity / ICD-9-.00 / Probable  Onychomycosis / ICD-9-.1 / Confirmed  Osteoarthritis of Knee / ICD-9-.96 / Confirmed  Stasis Dermatitis / ICD-9-.1 / Confirmed  Canceled: Hyperlipemia / ICD-9-.4      Histories   Past Medical History:    Active  Onychomycosis (110.1): Onset on 2008 at 43 years.  Comments:  2010 CST 10:46 AM CST - Barnett CMA, [place]  left small nail  Osteoarthritis of Knee (715.96)  Chronic Lumbar Pain (724.2)  Allergic Rhinitis (477.9)  Asthma (493.90)  External Hemorrhoids (455.3)  Stasis Dermatitis (454.1)  Acne (706.1)  CTS (Carpal Tunnel Syndrome) (354.0)  Increased BMI (783.9)   Family History:    Hypertension  Father ()  Heart disease  Father ()     Procedure history:    No active procedure history items have been selected or recorded.   Social History:        Alcohol Assessment: Current            Current, 1-2 times per week      Tobacco Assessment: Denies Tobacco Use      Substance Abuse Assessment: Denies Substance  Abuse      Employment and Education Assessment            Employed      Home and Environment Assessment            Marital status: .  2 children.  Living situation: Home/Independent.      Exercise and Physical Activity Assessment: Occasional exercise      Physical Examination   Vital Signs   12/4/2018 3:57 PM CST Peripheral Pulse Rate 57 bpm  LOW    Systolic Blood Pressure 128 mmHg    Diastolic Blood Pressure 86 mmHg  HI    Mean Arterial Pressure 100 mmHg    BP Site Left arm    Oxygen Saturation 99 %      Measurements from flowsheet : Measurements   12/4/2018 3:57 PM CST Height Measured - Standard 69 in    Weight Measured - Standard 242 lb    BSA 2.31 m2    Body Mass Index 35.73 kg/m2  HI      General:  No acute distress.    Neck:  Supple, No lymphadenopathy, No thyromegaly.    Respiratory:  Lungs are clear to auscultation, Respirations are non-labored, Breath sounds are equal, Symmetrical chest wall expansion.    Cardiovascular:  Normal rate, Regular rhythm, No murmur, No gallop, Good pulses equal in all extremities, Normal peripheral perfusion, No edema.    Gastrointestinal:  Soft, Non-tender, Non-distended, Normal bowel sounds, No organomegaly.    Integumentary:  Warm, Dry, Pink.    Neurologic:  Alert, Oriented.    Psychiatric:  Cooperative, Appropriate mood & affect.

## 2022-02-16 NOTE — PROGRESS NOTES
Patient:   MIMI MART            MRN: [id number]            FIN: [id number]               Age:   54 years     Sex:  Male     :  1965   Associated Diagnoses:   ADD (attention deficit disorder); Allergic Rhinitis   Author:   Karson Santana MD      Impression and Plan   Diagnosis     ADD (attention deficit disorder) (BBF68-AT F98.8).     Course:  Progressing as expected.    Orders     Orders   Charges (Evaluation and Management):  07563 office outpatient visit 15 minutes (Charge) (Order): Quantity: 1, ADD (attention deficit disorder)  Allergic Rhinitis.     Orders (Selected)   Prescriptions  Prescribed  methylphenidate 20 mg oral tablet: = 1 tab(s), Oral, daily, # 30 tab(s), 0 Refill(s), Type: Soft Stop.     Diagnosis     Allergic Rhinitis (STR19-MI J30.1).     Course:  Worsening.    Orders     Orders (Selected)   Outpatient Orders  Order  Kenalog-80: 80 mg, IM, once.        Visit Information      Date of Service: 2019 03:29 pm  Performing Location: [facility]  Encounter#: [id number]      Primary Care Provider (PCP):  Karson Santana MD    NPI# [id number]      Referring Provider:  Karson Santana MD# [id number]      Chief Complaint   2019 3:30 PM CDT    Pt here for allergies and med ck        History of Present Illness             The patient presents with Needs Methylphenidate refilled for treatment of Adult ADD.  Current regimen working well..               The patient presents with rhinorrhea.  The rhinorrhea is from both nostrils.  The rhinorrhea is described as clear.  The severity of the rhinorrhea is severe.  The rhinorrhea is constant and is worsening.  The context of the rhinorrhea: occurred after exposure to allergens.  Exacerbating factors consist of pollen.  Relieving factors consist of allergen avoidance and medication.  Associated symptoms consist of itchy eyes, nasal congestion, sneezing, denies fever, denies headache, denies cough, denies ear pain, denies  facial pain and denies sore throat.        Review of Systems   Constitutional:  Negative.    Eye:  Negative.    Ear/Nose/Mouth/Throat:  Negative except as documented in history of present illness.    Respiratory:  Negative.    Cardiovascular:  Negative.    Gastrointestinal:  Negative.    Genitourinary:  Negative.    Hematology/Lymphatics:  Negative.    Endocrine:  Negative.    Immunologic:  Negative.    Musculoskeletal:  Negative.    Integumentary:  Negative.    Neurologic:  Negative.    Psychiatric:  Negative.    All other systems reviewed and negative      Health Status   Allergies:    Allergic Reactions (Selected)  Severity Not Documented  Dilantin (Rash)   Medications:  (Selected)   Prescriptions  Prescribed  ProAir HFA 90 mcg/inh inhalation aerosol: See Instructions, Instructions: INHALE TWO PUFFS BY MOUTH FOUR TIMES A DAY AS NEEDED FOR WHEEZING OF FOR SHORTNESS OF BREATH, # 8.5 gm, 1 Refill(s), Type: Soft Stop, Pharmacy: [facility], INHALE TWO PUFFS BY MOUTH FOUR TIMES A DAY AS NEEDED FOR...  alfuzosin 10 mg oral tablet, extended release: = 1 tab(s) ( 10 mg ), po, daily, # 90 tab(s), 1 Refill(s), Type: Maintenance, Pharmacy: [facility], 1 tab(s) Oral daily  desoximetasone 0.05% topical cream: 1 jeff, TOP, BID, # 60 g, 1 Refill(s), Type: Maintenance, Pharmacy: [facility], 1 jeff Topical bid  furosemide 20 mg oral tablet: = 1 tab(s) ( 20 mg ), PO, Daily, PRN: for leg swelling, # 90 tab(s), 1 Refill(s), Type: Maintenance, Pharmacy: [facility], 1 tab(s) Oral daily,PRN:for leg swelling  methylphenidate 20 mg oral tablet: = 1 tab(s), Oral, daily, # 30 tab(s), 0 Refill(s), Type: Soft Stop  montelukast 10 mg oral tablet: = 1 tab(s) ( 10 mg ), Oral, daily, # 90 tab(s), 1 Refill(s), Type: Maintenance, Pharmacy: [facility], 1 tab(s) Oral daily  omeprazole 20 mg oral delayed release capsule: = 1 cap(s), Oral, bid, # 180 cap(s), 1 Refill(s), Type: Maintenance, Pharmacy: [facility],     Medications          *denotes recorded medication          ProAir HFA 90 mcg/inh inhalation aerosol: See Instructions, INHALE TWO PUFFS BY MOUTH FOUR TIMES A DAY AS NEEDED FOR WHEEZING OF FOR SHORTNESS OF BREATH, 8.5 gm, 1 Refill(s).          alfuzosin 10 mg oral tablet, extended release: 10 mg, 1 tab(s), po, daily, 90 tab(s), 1 Refill(s).          desoximetasone 0.05% topical cream: 1 jeff, TOP, BID, 60 g, 1 Refill(s).          furosemide 20 mg oral tablet: 20 mg, 1 tab(s), PO, Daily, PRN: for leg swelling, 90 tab(s), 1 Refill(s).          methylphenidate 20 mg oral tablet: 1 tab(s), Oral, daily, 30 tab(s), 0 Refill(s).          montelukast 10 mg oral tablet: 10 mg, 1 tab(s), Oral, daily, 90 tab(s), 1 Refill(s).          omeprazole 20 mg oral delayed release capsule: 1 cap(s), Oral, bid, 180 cap(s), 1 Refill(s).       Problem list:    All Problems (Selected)  Acne / ICD-9-.1 / Confirmed  ADD (attention deficit disorder) / SNOMED CT 8640699547 / Confirmed  Adenomatous colon polyp / SNOMED CT 0125767012 / Confirmed  Allergic Rhinitis / ICD-9-.9 / Confirmed  Asthma / ICD-9-.90 / Confirmed  BPH (benign prostatic hypertrophy) / SNOMED CT 273378437 / Confirmed  Chronic Lumbar Pain / ICD-9-.2 / Confirmed  CTS (Carpal Tunnel Syndrome) / ICD-9-.0 / Confirmed  External Hemorrhoids / ICD-9-.3 / Confirmed  GERD (gastroesophageal reflux disease) / SNOMED CT 171547511 / Confirmed  Hyperlipidemia / SNOMED CT 74504010 / Confirmed  Increased BMI / ICD-9-.9 / Confirmed  Low testosterone / SNOMED CT 4764814455 / Confirmed  Lower leg edema / SNOMED CT 732600583 / Confirmed  Male hypogonadism / SNOMED CT 19889323 / Confirmed  Obesity / ICD-9-.00 / Probable  Onychomycosis / ICD-9-.1 / Confirmed  Osteoarthritis of Knee / ICD-9-.96 / Confirmed  Stasis Dermatitis / ICD-9-.1 / Confirmed      Histories   Past Medical History:    Active  Onychomycosis (110.1): Onset on  2008 at 43 years.  Comments:  2010 CST 10:46 AM CST - Barnett Morena BLAKE  left small nail  Osteoarthritis of Knee (715.96)  Chronic Lumbar Pain (724.2)  Allergic Rhinitis (477.9)  Asthma (493.90)  External Hemorrhoids (455.3)  Stasis Dermatitis (454.1)  Acne (706.1)  CTS (Carpal Tunnel Syndrome) (354.0)  Increased BMI (783.9)   Family History:    Hypertension  Father ()  Heart disease  Father ()     Procedure history:    Colonoscopy (SNOMED CT 885060263) performed by Duarte Dean MD on 2018 at 53 Years.  Comments:  1/10/2019 9:36 AM CST - Tory Hicks  Indication:  Screening.  Sedation:  MAC.  Findings:  Polyps cecum, sigmoid colon, and right colon.  Thrombosed external hemorrhoids.  Perianal skin tags.  Recommendation:  Repeat in 5 years.   Social History:        Alcohol Assessment: Current            Current, 1-2 times per week      Tobacco Assessment: Denies Tobacco Use      Substance Abuse Assessment: Denies Substance Abuse      Employment and Education Assessment            Employed      Home and Environment Assessment            Marital status: .  2 children.  Living situation: Home/Independent.      Nutrition and Health Assessment                     Comments:                      2018 - Shamika Astorga                     Restrictions:  Green peppers.      Exercise and Physical Activity Assessment: Regular exercise            Exercise frequency: Daily.  Exercise type: work.        Physical Examination   Vital Signs   2019 3:30 PM CDT Peripheral Pulse Rate 70 bpm    Systolic Blood Pressure 132 mmHg  HI    Diastolic Blood Pressure 82 mmHg  HI    Mean Arterial Pressure 99 mmHg    Oxygen Saturation 98 %      Measurements from flowsheet : Measurements   2019 3:30 PM CDT Height Measured - Standard 69 in    Weight Measured - Standard 242 lb    BSA 2.31 m2    Body Mass Index 35.73 kg/m2  HI      Eye:  Pupils are equal, round and reactive to light, Extraocular  movements are intact, Normal conjunctiva.    HENT:  Normocephalic, Normal hearing, Oral mucosa is moist, No pharyngeal erythema, No sinus tenderness, nasal mucosa congested.    Neck:  Supple, No lymphadenopathy, No thyromegaly.    Respiratory:  Lungs are clear to auscultation, Respirations are non-labored, Breath sounds are equal.    Cardiovascular:  Normal rate, Regular rhythm, Normal peripheral perfusion, No edema.    Genitourinary:       Testes: Bilateral, Within normal limits.    Integumentary:  Warm, Dry, Pink, No rash.    Neurologic:  Alert, Oriented, No neurologic tics noted on examination.    Psychiatric:  Cooperative, Appropriate mood & affect, Normal judgment.

## 2022-02-16 NOTE — PROGRESS NOTES
Patient:   MIMI MART            MRN: [id number]            FIN: [id number]               Age:   52 years     Sex:  Male     :  1965   Associated Diagnoses:   ADD (attention deficit disorder); GERD (gastroesophageal reflux disease); Allergic Rhinitis; Asthma   Author:   Max MCLEOD, Karson      Impression and Plan   Diagnosis     ADD (attention deficit disorder) ([id number] F98.8).     Course:  Progressing as expected.    Orders     Orders   Charges (Evaluation and Management):  57672 office outpatient visit 25 minutes (Charge) (Order): Quantity: 1, Asthma  GERD (gastroesophageal reflux disease)  BPH (benign prostatic hypertrophy)  Low testosterone  Hyperlipidemia.     Orders (Selected)   Prescriptions  Prescribed  methylphenidate 20 mg oral tablet: 1 tab(s) ( 20 mg ), PO, daily, # 30 tab(s), 0 Refill(s), Type: Maintenance, Pharmacy: [facility], 1 tab(s) po daily.     Diagnosis     GERD (gastroesophageal reflux disease) (YRH83-AP K21.9).     Course:  Progressing as expected.    Orders     Orders (Selected)   Prescriptions  Prescribed  omeprazole 20 mg oral delayed release capsule: 1 cap(s) ( 20 mg ), po, bid, # 60 cap(s), 11 Refill(s), Type: Maintenance, Pharmacy: [facility], 1 cap(s) po bid.     Diagnosis     Allergic Rhinitis (BYZ71-BQ J30.1).     Asthma (QYP61-FN J45.909).     Course:  Progressing as expected.    Orders     Orders (Selected)   Outpatient Orders  Ordered  triamcinolone acetonide 40 mg/mL injectable suspension: 80 mg, im, once  Prescriptions  Prescribed  ProAir HFA 90 mcg/inh inhalation aerosol: See Instructions, Instructions: INHALE TWO PUFFS BY MOUTH FOUR TIMES A DAY AS NEEDED FOR WHEEZING OF FOR SHORTNESS OF BREATH, # 8.5 gm, 1 Refill(s), Type: Soft Stop, Pharmacy: [facility], INHALE TWO PUFFS BY MOUTH FOUR TIMES A DAY AS NEEDED FOR...  Singulair 10 mg oral tablet: 1 tab(s) ( 10 mg ), PO, qPM, PRN: for allergy symptoms, # 30 tab(s), 11 Refill(s), Type: Maintenance,  Pharmacy: [facility], 1 tab(s) po qpm,PRN:for allergy symptoms  Discontinued  Allegra 180 mg oral tablet: 1 tab(s) ( 180 mg ), po, daily, # 30 tab(s), 11 Refill(s), Type: Maintenance, Pharmacy: [facility], 1 tab(s) po daily  Sudafed 12-Hour 120 mg oral tablet, extended release: 1 tab(s) ( 120 mg ), po, q12 hrs, Instructions: may substitute 24 hour formulation, # 60 tab(s), 11 Refill(s), Type: Maintenance, Pharmacy: [facility], 1 tab(s) po q12 hrs,Instr:may substitute 24 hour formulation.        Visit Information      Date of Service: 05/09/2018 01:16 pm  Performing Location: [facility]  Encounter#: [id number]      Primary Care Provider (PCP):  Karson Santana MD# [id number]      Referring Provider:  Karson Santana MD# [id number]   Visit type:  Scheduled follow-up.    Accompanied by:  No one.    Source of history:  Self.    Referral source:  Self.    History limitation:  None.       Chief Complaint   5/9/2018 1:26 PM CDT     Pt here for med ck        History of Present Illness             The patient presents Attention Deficit Discorder.  There are no modifying factors.  Associated symptoms consist of denies decrease in social interaction, denies fatigue, denies impaired judgement, denies insomnia, denies loss of appetite and denies weight loss.  Additional pertinent history: none.               The patient presents with rhinorrhea.  The rhinorrhea is from both nostrils.  The rhinorrhea is described as clear.  The severity of the rhinorrhea is severe.  The rhinorrhea is constant and is worsening.  The context of the rhinorrhea: occurred after exposure to allergens.  Exacerbating factors consist of pollen.  Relieving factors consist of allergen avoidance and medication.  Associated symptoms consist of itchy eyes, nasal congestion, sneezing, denies fever, denies headache, denies cough, denies ear pain, denies facial pain and denies sore throat.        Interval History    Reflux Esophagitis   The course is progressing as expected.  Compliance problems: none.  The effect on daily activities is no change in eating habits, no change in sleeping patterns and no change in work/school duties.  There were disease related encounters including none.  Associated symptoms characterized by no weight loss, chest pain, wheeze, epigastric pain, hematemesis, loss of appetite or nausea.        Review of Systems   Constitutional:  Fatigue.    Eye:  Negative.    Ear/Nose/Mouth/Throat:  Negative.    Respiratory:  Negative.    Cardiovascular:  Negative.    Gastrointestinal:  Negative.    Genitourinary:  Negative.    Hematology/Lymphatics:  Negative.    Endocrine:  Negative.    Immunologic:  Negative.    Musculoskeletal:  Negative.    Integumentary:  Negative.    Neurologic:  Negative.    Psychiatric:  Negative.    All other systems reviewed and negative      Health Status   Allergies:    Allergic Reactions (Selected)  Severity Not Documented  Dilantin (Rash)   Medications:  (Selected)   Outpatient Medications  Ordered  triamcinolone acetonide 40 mg/mL injectable suspension: 80 mg, im, once  Prescriptions  Prescribed  ProAir HFA 90 mcg/inh inhalation aerosol: See Instructions, Instructions: INHALE TWO PUFFS BY MOUTH FOUR TIMES A DAY AS NEEDED FOR WHEEZING OF FOR SHORTNESS OF BREATH, # 8.5 gm, 1 Refill(s), Type: Soft Stop, Pharmacy: [facility], INHALE TWO PUFFS BY MOUTH FOUR TIMES A DAY AS NEEDED FOR...  Singulair 10 mg oral tablet: 1 tab(s) ( 10 mg ), PO, qPM, PRN: for allergy symptoms, # 30 tab(s), 11 Refill(s), Type: Maintenance, Pharmacy: [facility], 1 tab(s) po qpm,PRN:for allergy symptoms  alfuzosin 10 mg oral tablet, extended release: 1 tab(s) ( 10 mg ), po, daily, # 30 tab(s), 11 Refill(s), Type: Maintenance, Pharmacy: [facility], 1 tab(s) po daily  doxycycline hyclate 100 mg oral tablet: 1 tab(s) ( 100 mg ), PO, Daily, # 30 tab(s), 11 Refill(s), Type: Maintenance, Pharmacy:  [facility], 1 tab(s) po daily,x30 day(s)  furosemide 20 mg oral tablet: 1 tab(s) ( 20 mg ), PO, Daily, PRN: for leg swelling, # 30 tab(s), 11 Refill(s), Type: Maintenance, Pharmacy: [facility], 1 tab(s) po daily,PRN:for leg swelling  methylphenidate 20 mg oral tablet: 1 tab(s) ( 20 mg ), PO, daily, # 30 tab(s), 0 Refill(s), Type: Maintenance, Pharmacy: [facility], 1 tab(s) po daily  omeprazole 20 mg oral delayed release capsule: 1 cap(s) ( 20 mg ), po, bid, # 60 cap(s), 11 Refill(s), Type: Maintenance, Pharmacy: [facility], 1 cap(s) po bid   Problem list:    All Problems  Acne / ICD-9-.1 / Confirmed  ADD (attention deficit disorder) / SNOMED CT 2066782485 / Confirmed  Allergic Rhinitis / ICD-9-.9 / Confirmed  Asthma / ICD-9-.90 / Confirmed  BPH (benign prostatic hypertrophy) / SNOMED CT 477329235 / Confirmed  Chronic Lumbar Pain / ICD-9-.2 / Confirmed  CTS (Carpal Tunnel Syndrome) / ICD-9-.0 / Confirmed  External Hemorrhoids / ICD-9-.3 / Confirmed  GERD (gastroesophageal reflux disease) / SNOMED CT 424934080 / Confirmed  Hyperlipidemia / SNOMED CT 20946445 / Confirmed  Increased BMI / ICD-9-.9 / Confirmed  Low testosterone / SNOMED CT 5660633418 / Confirmed  Lower leg edema / SNOMED CT 975985996 / Confirmed  Male hypogonadism / SNOMED CT 54620435 / Confirmed  Obesity / ICD-9-.00 / Probable  Onychomycosis / ICD-9-.1 / Confirmed  Osteoarthritis of Knee / ICD-9-.96 / Confirmed  Stasis Dermatitis / ICD-9-.1 / Confirmed  Canceled: Hyperlipemia / ICD-9-.4      Histories   Past Medical History:    Active  Onychomycosis (110.1): Onset on 8/27/2008 at 43 years.  Comments:  2/8/2010 CST 10:46 AM CST - Barnett CMA, Morena  left small nail  Osteoarthritis of Knee (715.96)  Chronic Lumbar Pain (724.2)  Allergic Rhinitis (477.9)  Asthma (493.90)  External Hemorrhoids (455.3)  Stasis Dermatitis (454.1)  Acne (706.1)  CTS (Carpal Tunnel  Syndrome) (354.0)  Increased BMI (783.9)   Family History:    Hypertension  Father ()  Heart disease  Father ()     Procedure history:    No active procedure history items have been selected or recorded.   Social History:        Alcohol Assessment: Current            Current, 1-2 times per week      Tobacco Assessment: Denies Tobacco Use      Substance Abuse Assessment: Denies Substance Abuse      Employment and Education Assessment            Employed      Home and Environment Assessment            Marital status: .  2 children.  Living situation: Home/Independent.      Exercise and Physical Activity Assessment: Occasional exercise        Physical Examination   Vital Signs   2018 1:26 PM CDT Peripheral Pulse Rate 63 bpm    Systolic Blood Pressure 130 mmHg    Diastolic Blood Pressure 76 mmHg    Mean Arterial Pressure 94 mmHg    BP Site Right arm    Oxygen Saturation 98 %      Measurements from flowsheet : Measurements   2018 1:26 PM CDT Height Measured - Standard 69 in    Weight Measured - Standard 246.6 lb    BSA 2.33 m2    Body Mass Index 36.41 kg/m2  HI      General:  No acute distress.    Neck:  Supple, No lymphadenopathy, No thyromegaly.    Respiratory:  Lungs are clear to auscultation, Respirations are non-labored, Breath sounds are equal, Symmetrical chest wall expansion.    Cardiovascular:  Normal rate, Regular rhythm, No murmur, No gallop, Good pulses equal in all extremities, Normal peripheral perfusion, No edema.    Gastrointestinal:  Soft, Non-tender, Non-distended, Normal bowel sounds, No organomegaly.    Integumentary:  Warm, Dry, Pink.    Neurologic:  Alert, Oriented.    Psychiatric:  Cooperative, Appropriate mood & affect.

## 2022-02-16 NOTE — PROGRESS NOTES
Patient:   MIMI MART            MRN: [id number]            FIN: [id number]               Age:   51 years     Sex:  Male     :  1965   Associated Diagnoses:   GERD (gastroesophageal reflux disease); Allergic Rhinitis; ADD (attention deficit disorder); BPH (benign prostatic hypertrophy)   Author:   Karson Santana MD      Impression and Plan   Diagnosis     GERD (gastroesophageal reflux disease) (UQD32-UU K21.9).     Course:  Progressing as expected.    Orders     Orders   Charges (Evaluation and Management):  05390 office outpatient visit 25 minutes (Charge) (Order): Quantity: 1, GERD (gastroesophageal reflux disease)  BPH (benign prostatic hypertrophy)  Hyperlipidemia  ADD (attention deficit disorder)  Lower leg edema.     Orders (Selected)   Prescriptions  Prescribed  omeprazole 20 mg oral delayed release capsule: 1 cap(s) ( 20 mg ), po, bid, # 60 cap(s), 11 Refill(s), Type: Maintenance, Pharmacy: [facility], 1 cap(s) po bid.     Diagnosis     Allergic Rhinitis (AOO66-FQ J30.1).     Course:  Worsening.    Orders     Orders (Selected)   Outpatient Orders  Order  kenalog injection: 80 mg, im, once  Prescriptions  Prescribed  Allegra 180 mg oral tablet: 1 tab(s) ( 180 mg ), po, daily, # 30 tab(s), 11 Refill(s), Type: Maintenance, Pharmacy: [facility], 1 tab(s) po daily  ProAir HFA 90 mcg/inh inhalation aerosol: See Instructions, Instructions: INHALE TWO PUFFS BY MOUTH FOUR TIMES A DAY AS NEEDED FOR WHEEZING OF FOR SHORTNESS OF BREATH, # 8.5 gm, 1 Refill(s), Type: Soft Stop, Pharmacy: [facility], INHALE TWO PUFFS BY MOUTH FOUR TIMES A DAY AS NEEDED FOR...  Singulair 10 mg oral tablet: 1 tab(s) ( 10 mg ), PO, qPM, PRN: for allergy symptoms, # 30 tab(s), 11 Refill(s), Type: Maintenance, Pharmacy: [facility], 1 tab(s) po qpm,PRN:for allergy symptoms  Sudafed 12-Hour 120 mg oral tablet, extended release: 1 tab(s) ( 120 mg ), po, q12 hrs, Instructions: may substitute 24 hour  formulation, # 60 tab(s), 11 Refill(s), Type: Maintenance, Pharmacy: [facility], 1 tab(s) po q12 hrs,Instr:may substitute 24 hour formulation.     Diagnosis     ADD (attention deficit disorder) (CNM65-XH F98.8).     Course:  Progressing as expected.    Orders     Orders (Selected)   Prescriptions  Prescribed  methylphenidate 20 mg oral tablet: 1 tab(s) ( 20 mg ), PO, daily, # 30 tab(s), 0 Refill(s), Type: Maintenance.     Diagnosis     BPH (benign prostatic hypertrophy) (EOX19-KI N40.0).     Course:  Progressing as expected.    Orders     Orders (Selected)   Prescriptions  Prescribed  alfuzosin 10 mg oral tablet, extended release: 1 tab(s) ( 10 mg ), po, daily, # 30 tab(s), 11 Refill(s), Type: Maintenance, Pharmacy: [facility], 1 tab(s) po daily.        Visit Information   Visit type:  Scheduled follow-up.    Accompanied by:  No one.    Source of history:  Self.    Referral source:  Self.    History limitation:  None.       Chief Complaint   5/8/2017 10:09 AM CDT    Pt here for kenalog injection        History of Present Illness             The patient presents Attention Deficit Discorder.  There are no modifying factors.  Associated symptoms consist of denies decrease in social interaction, denies fatigue, denies impaired judgement, denies insomnia, denies loss of appetite and denies weight loss.  Additional pertinent history: none.               The patient presents with rhinorrhea.  The rhinorrhea is from both nostrils.  The rhinorrhea is described as clear.  The severity of the rhinorrhea is severe.  The rhinorrhea is constant and is worsening.  The context of the rhinorrhea: occurred after exposure to allergens.  Exacerbating factors consist of pollen.  Relieving factors consist of allergen avoidance and medication.  Associated symptoms consist of itchy eyes, nasal congestion, sneezing, denies fever, denies headache, denies cough, denies ear pain, denies facial pain and denies sore throat.         Interval History   Reflux Esophagitis   The course is progressing as expected.  Compliance problems: none.  The effect on daily activities is no change in eating habits, no change in sleeping patterns and no change in work/school duties.  There were disease related encounters including none.  Associated symptoms characterized by no weight loss, chest pain, wheeze, epigastric pain, hematemesis, loss of appetite or nausea.        Review of Systems   Constitutional:  Fatigue.    Eye:  Negative.    Ear/Nose/Mouth/Throat:  Negative.    Respiratory:  Negative.    Cardiovascular:  Negative.    Gastrointestinal:  Negative.    Genitourinary:  Negative.    Hematology/Lymphatics:  Negative.    Endocrine:  Negative.    Immunologic:  Negative.    Musculoskeletal:  Negative.    Integumentary:  Negative.    Neurologic:  Negative.    Psychiatric:  Negative.    All other systems reviewed and negative      Health Status   Allergies:    Allergic Reactions (Selected)  Severity Not Documented  Dilantin (Rash)   Medications:  (Selected)   Prescriptions  Prescribed  Allegra 180 mg oral tablet: 1 tab(s) ( 180 mg ), po, daily, # 30 tab(s), 11 Refill(s), Type: Maintenance, Pharmacy: [facility], 1 tab(s) po daily  ProAir HFA 90 mcg/inh inhalation aerosol: See Instructions, Instructions: INHALE TWO PUFFS BY MOUTH FOUR TIMES A DAY AS NEEDED FOR WHEEZING OF FOR SHORTNESS OF BREATH, # 8.5 gm, 1 Refill(s), Type: Soft Stop, Pharmacy: [facility], INHALE TWO PUFFS BY MOUTH FOUR TIMES A DAY AS NEEDED FOR...  Singulair 10 mg oral tablet: 1 tab(s) ( 10 mg ), PO, qPM, PRN: for allergy symptoms, # 30 tab(s), 11 Refill(s), Type: Maintenance, Pharmacy: [facility], 1 tab(s) po qpm,PRN:for allergy symptoms  Sudafed 12-Hour 120 mg oral tablet, extended release: 1 tab(s) ( 120 mg ), po, q12 hrs, Instructions: may substitute 24 hour formulation, # 60 tab(s), 11 Refill(s), Type: Maintenance, Pharmacy: [facility], 1 tab(s) po q12  hrs,Instr:may substitute 24 hour formulation  alfuzosin 10 mg oral tablet, extended release: 1 tab(s) ( 10 mg ), po, daily, # 30 tab(s), 11 Refill(s), Type: Maintenance, Pharmacy: [facility], 1 tab(s) po daily  cyclobenzaprine 10 mg oral tablet: 1 tab(s), PO, q6hr, PRN: for spasm, # 30 tab(s), 5 Refill(s), Type: Maintenance, Pharmacy: [facility], 1 tab(s) po q6 hr,PRN:for spasm  furosemide 20 mg oral tablet: 1 tab(s) ( 20 mg ), PO, Daily, PRN: for leg swelling, # 30 tab(s), 11 Refill(s), Type: Maintenance, Pharmacy: [facility], 1 tab(s) po daily,PRN:for leg swelling  methylphenidate 20 mg oral tablet: 1 tab(s) ( 20 mg ), PO, daily, # 30 tab(s), 0 Refill(s), Type: Maintenance  omeprazole 20 mg oral delayed release capsule: 1 cap(s) ( 20 mg ), po, bid, # 60 cap(s), 11 Refill(s), Type: Maintenance, Pharmacy: [facility], 1 cap(s) po bid   Problem list:    All Problems (Selected)  Acne / ICD-9-.1 / Confirmed  ADD (attention deficit disorder) / SNOMED CT 0162927166 / Confirmed  Allergic Rhinitis / ICD-9-.9 / Confirmed  Asthma / ICD-9-.90 / Confirmed  BPH (benign prostatic hypertrophy) / SNOMED CT 151473485 / Confirmed  Chronic Lumbar Pain / ICD-9-.2 / Confirmed  CTS (Carpal Tunnel Syndrome) / ICD-9-.0 / Confirmed  External Hemorrhoids / ICD-9-.3 / Confirmed  GERD (gastroesophageal reflux disease) / SNOMED CT 562706404 / Confirmed  Hyperlipidemia / SNOMED CT 09523553 / Confirmed  Increased BMI / ICD-9-.9 / Confirmed  Lower leg edema / SNOMED CT 542379121 / Confirmed  Obesity / ICD-9-.00 / Probable  Onychomycosis / ICD-9-.1 / Confirmed  Osteoarthritis of Knee / ICD-9-.96 / Confirmed  Stasis Dermatitis / ICD-9-.1 / Confirmed      Histories   Past Medical History:    Active  Onychomycosis (110.1): Onset on 8/27/2008 at 43 years.  Comments:  2/8/2010 CST 10:46 AM CST - Barnett CMA, Morena  left small nail  Osteoarthritis of Knee  (715.96)  Chronic Lumbar Pain (724.2)  Allergic Rhinitis (477.9)  Asthma (493.90)  External Hemorrhoids (455.3)  Stasis Dermatitis (454.1)  Acne (706.1)  CTS (Carpal Tunnel Syndrome) (354.0)  Increased BMI (783.9)   Family History:    Hypertension  Father ()  Heart disease  Father ()     Procedure history:    No active procedure history items have been selected or recorded.   Social History:        Alcohol Assessment: Current            Current, 1-2 times per week      Tobacco Assessment: Denies Tobacco Use      Exercise and Physical Activity Assessment: Occasional exercise        Physical Examination   Vital Signs   2017 10:09 AM CDT Peripheral Pulse Rate 76 bpm    Pulse Site Radial artery    HR Method Manual    Systolic Blood Pressure 134 mmHg    Diastolic Blood Pressure 86 mmHg    Mean Arterial Pressure 102 mmHg    BP Site Left arm    BP Method Electronic      Measurements from flowsheet : Measurements   2017 10:09 AM CDT    Weight Measured - Standard                252.6 lb     General:  No acute distress.    Neck:  Supple, No lymphadenopathy, No thyromegaly.    Respiratory:  Lungs are clear to auscultation, Respirations are non-labored, Breath sounds are equal, Symmetrical chest wall expansion.    Cardiovascular:  Normal rate, Regular rhythm, No murmur, No gallop, Good pulses equal in all extremities, Normal peripheral perfusion, No edema.    Gastrointestinal:  Soft, Non-tender, Non-distended, Normal bowel sounds, No organomegaly.    Integumentary:  Warm, Dry, Pink.    Neurologic:  Alert, Oriented.    Psychiatric:  Cooperative, Appropriate mood & affect.

## 2022-02-16 NOTE — NURSING NOTE
Comprehensive Intake Entered On:  6/16/2020 2:41 PM CDT    Performed On:  6/16/2020 2:37 PM CDT by Bethany Garcia CMA               Summary   Chief Complaint :   Pt here for kenalog shot for allergies   Weight Measured :   242 lb(Converted to: 242 lb 0 oz, 109.77 kg)    Height Measured :   69 in(Converted to: 5 ft 9 in, 175.26 cm)    Body Mass Index :   35.73 kg/m2 (HI)    Body Surface Area :   2.31 m2   Systolic Blood Pressure :   132 mmHg   Diastolic Blood Pressure :   78 mmHg   Mean Arterial Pressure :   96 mmHg   Peripheral Pulse Rate :   62 bpm   Temperature Tympanic :   98.2 DegF(Converted to: 36.8 DegC)    Oxygen Saturation :   97 %   Bethany Garcia CMA - 6/16/2020 2:37 PM CDT   Health Status   Allergies Verified? :   Yes   Medication History Verified? :   Yes   Medical History Verified? :   Yes   Tobacco Use? :   Never smoker   Bethany Garcia CMA - 6/16/2020 2:37 PM CDT   Consents   Consent for Immunization Exchange :   Consent Granted   Consent for Immunizations to Providers :   Consent Granted   Bethany Garcia CMA - 6/16/2020 2:37 PM CDT   Meds / Allergies   (As Of: 6/16/2020 2:41:57 PM CDT)   Allergies (Active)   Dilantin  Estimated Onset Date:   Unspecified ; Reactions:   rash ; Created By:   Morena Barnett; Reaction Status:   Active ; Category:   Drug ; Substance:   Dilantin ; Type:   Allergy ; Updated By:   Morena Barnett; Reviewed Date:   6/16/2020 2:40 PM CDT        Medication List   (As Of: 6/16/2020 2:41:58 PM CDT)   Prescription/Discharge Order    albuterol  :   albuterol ; Status:   Prescribed ; Ordered As Mnemonic:   ProAir HFA 90 mcg/inh inhalation aerosol ; Simple Display Line:   See Instructions, INHALE TWO PUFFS BY MOUTH FOUR TIMES A DAY AS NEEDED FOR WHEEZING OF FOR SHORTNESS OF BREATH, 8.5 gm, 1 Refill(s) ; Ordering Provider:   Karson Santana MD; Catalog Code:   albuterol ; Order Dt/Tm:   5/22/2019 4:39:06 PM CDT          alfuzosin  :   alfuzosin ; Status:   Prescribed ; Ordered As  Mnemonic:   alfuzosin 10 mg oral tablet, extended release ; Simple Display Line:   1 tab(s), Oral, daily, 90 tab(s), 1 Refill(s) ; Ordering Provider:   Karson Santana MD; Catalog Code:   alfuzosin ; Order Dt/Tm:   12/30/2019 3:31:32 PM CST          cyclobenzaprine  :   cyclobenzaprine ; Status:   Prescribed ; Ordered As Mnemonic:   cyclobenzaprine 10 mg oral tablet ; Simple Display Line:   1 tab(s), PO, q6hr, PRN: for spasm, 30 tab(s), 5 Refill(s) ; Ordering Provider:   Karson Santana MD; Catalog Code:   cyclobenzaprine ; Order Dt/Tm:   10/28/2019 9:39:12 AM CDT          desoximetasone topical  :   desoximetasone topical ; Status:   Prescribed ; Ordered As Mnemonic:   desoximetasone 0.05% topical cream ; Simple Display Line:   1 jeff, TOP, BID, 60 g, 1 Refill(s) ; Ordering Provider:   Karson Santana MD; Catalog Code:   desoximetasone topical ; Order Dt/Tm:   5/22/2019 4:39:13 PM CDT          furosemide  :   furosemide ; Status:   Prescribed ; Ordered As Mnemonic:   furosemide 20 mg oral tablet ; Simple Display Line:   20 mg, 1 tab(s), PO, Daily, PRN: for leg swelling, 90 tab(s), 1 Refill(s) ; Ordering Provider:   Karson Santana MD; Catalog Code:   furosemide ; Order Dt/Tm:   5/22/2019 4:38:52 PM CDT          methylphenidate 20 mg oral tablet  :   methylphenidate 20 mg oral tablet ; Status:   Prescribed ; Ordered As Mnemonic:   methylphenidate 20 mg oral tablet ; Simple Display Line:   1 tab(s), Oral, daily, 30 tab(s) ; Ordering Provider:   Karson Santana MD; Catalog Code:   methylphenidate ; Order Dt/Tm:   12/31/2019 10:34:25 AM CST          montelukast  :   montelukast ; Status:   Prescribed ; Ordered As Mnemonic:   montelukast 10 mg oral tablet ; Simple Display Line:   1 tab(s), Oral, daily, 90 tab(s), 1 Refill(s) ; Ordering Provider:   Karson Santana MD; Catalog Code:   montelukast ; Order Dt/Tm:   12/30/2019 3:31:50 PM CST          omeprazole  :   omeprazole ; Status:   Prescribed ; Ordered As Mnemonic:    omeprazole 20 mg oral delayed release capsule ; Simple Display Line:   1 cap(s), Oral, bid, 180 cap(s), 1 Refill(s) ; Ordering Provider:   Karson Santana MD; Catalog Code:   omeprazole ; Order Dt/Tm:   4/6/2020 8:15:16 AM CDT            ID Risk Screen   Recent Travel History :   No recent travel   Family Member Travel History :   No recent travel   Other Exposure to Infectious Disease :   Unknown   Bethany Garcia CMA - 6/16/2020 2:37 PM CDT

## 2022-02-16 NOTE — PROGRESS NOTES
Patient:   MIMI MART            MRN: [id number]            FIN: [id number]               Age:   55 years     Sex:  Male     :  1965   Associated Diagnoses:   Acute maxillary sinusitis; GERD (gastroesophageal reflux disease)   Author:   Karson Hinds PA-C      Visit Information      Date of Service: 2020 10:44 am  Performing Location: [facility]  Encounter#: [id number]      Primary Care Provider (PCP):  Karson Santana MD    NPI# [id number]   Visit type:  Video Visit via Ebyline or RIGID.    Participants in room during visit:  _1   Location of patient:  _home  Location of provider:  _ (Clinic office   Video Start Time:  _1128  Video End Time:   _1134    Today's visit was conducted via video conference due to the COVID-19 pandemic.  The patient's consent to proceed with a video visit has been obtained and documented.      Chief Complaint   2020 11:11 AM CST  Verbal consent given for video visit.  Pt is c/o sinus pressure x month.  Pt states he had positive COVID test 20.  Pt also requesting refill on his omeprazole to [facility] pharmacy in [place].        History of Present Illness   Patient is a _55 year old _male who is being evaluated via a billable video visit.  he has had sinus pressure for a month  pressure in cheeks, nasal congestion, no cough  taking mucinex, sudafed, tylenol and ibuprofen    he had positive Covid test 20, had sxs for 3 weeks    he would also like a refill of his omeprazole, he takes it 1-2 times daily as needed            Review of Systems   Constitutional:  Negative.    Ear/Nose/Mouth/Throat:  Nasal congestion, sinus pressure.    Respiratory:  No cough.       Health Status   Allergies:    Allergic Reactions (Selected)  Severity Not Documented  Dilantin (Rash)   Medications:  (Selected)   Prescriptions  Prescribed  ProAir HFA 90 mcg/inh inhalation aerosol: See Instructions, Instructions: INHALE TWO PUFFS BY MOUTH FOUR TIMES A DAY AS NEEDED  FOR WHEEZING OF FOR SHORTNESS OF BREATH, # 8.5 gm, 1 Refill(s), Type: Soft Stop, Pharmacy: [facility], INHALE TWO PUFFS BY MOUTH FOUR TIMES A DAY AS NEEDED FOR...  alfuzosin 10 mg oral tablet, extended release: = 1 tab(s), Oral, daily, # 90 tab(s), 1 Refill(s), Type: Maintenance, Pharmacy: [facility], 1 tab(s) Oral daily, 69, in, 06/16/20 14:37:00 CDT, Height Measured, 242, lb, 06/16/20 14:37:00 CDT, Weight Measured  cyclobenzaprine 10 mg oral tablet: = 1 tab(s), PO, q6hr, PRN: for spasm, # 30 tab(s), 5 Refill(s), Type: Maintenance, other reason (Rx)  desoximetasone 0.05% topical cream: 1 jeff, TOP, BID, # 60 g, 1 Refill(s), Type: Maintenance, Pharmacy: [facility], 1 jeff Topical bid  furosemide 20 mg oral tablet: = 1 tab(s) ( 20 mg ), PO, Daily, PRN: for leg swelling, # 90 tab(s), 1 Refill(s), Type: Maintenance, Pharmacy: [facility], 1 tab(s) Oral daily,PRN:for leg swelling  methylphenidate 20 mg oral tablet: = 1 tab(s), Oral, daily, # 30 tab(s), 0 Refill(s), Type: Soft Stop, Pharmacy: [facility], 1 tab(s) Oral daily, 69, in, 08/27/20 15:59:00 CDT, Height Measured, 247, lb, 08/27/20 15:59:00 CDT, Weight Measured  montelukast 10 mg oral tablet: = 1 tab(s), Oral, daily, # 90 tab(s), 1 Refill(s), Type: Maintenance, Pharmacy: [facility], 1 tab(s) Oral daily, 69, in, 06/16/20 14:37:00 CDT, Height Measured, 242, lb, 06/16/20 14:37:00 CDT, Weight Measured  omeprazole 20 mg oral delayed release capsule: = 1 cap(s), Oral, bid, # 180 cap(s), 1 Refill(s), Type: Maintenance, Pharmacy: [facility], 1 cap(s) Oral bid, 69, in, 06/16/20 14:37:00 CDT, Height Measured, 242, lb, 06/16/20 14:37:00 CDT, Weight Measured  Documented Medications  Documented  loratadine 10 mg oral tablet: = 1 tab(s) ( 10 mg ), Oral, daily, 0 Refill(s), Type: Maintenance   Problem list:    All Problems  Onychomycosis / ICD-9-.1 / Confirmed  Lower leg edema / SNOMED CT 210414845 / Confirmed  ADD (attention deficit  disorder) / SNOMED CT 9953193331 / Confirmed  Adenomatous colon polyp / SNOMED CT 5711844722 / Confirmed  Obesity / ICD-9-.00 / Probable  Low testosterone / SNOMED CT 2451657624 / Confirmed  GERD (gastroesophageal reflux disease) / SNOMED CT 513574512 / Confirmed  CTS (Carpal Tunnel Syndrome) / ICD-9-.0 / Confirmed  BPH (benign prostatic hypertrophy) / SNOMED CT 194406809 / Confirmed  Stasis Dermatitis / ICD-9-.1 / Confirmed  External Hemorrhoids / ICD-9-.3 / Confirmed  Allergic Rhinitis / ICD-9-.9 / Confirmed  Allergic rhinitis, seasonal / SNOMED CT 689824900 / Confirmed  Asthma / ICD-9-.90 / Confirmed  Acne / ICD-9-.1 / Confirmed  Osteoarthritis of Knee / ICD-9-.96 / Confirmed  Chronic Lumbar Pain / ICD-9-.2 / Confirmed  Increased BMI / ICD-9-.9 / Confirmed  Male hypogonadism / SNOMED CT 85137117 / Confirmed  Hyperlipidemia / SNOMED CT 03814393 / Confirmed      Histories   Past Medical History:    Active  Onychomycosis (110.1): Onset on 2008 at 43 years.  Comments:  2010 CST 10:46 AM CST - Morena Barnett CMA  left small nail  Osteoarthritis of Knee (715.96)  Chronic Lumbar Pain (724.2)  Allergic Rhinitis (477.9)  Asthma (493.90)  External Hemorrhoids (455.3)  Stasis Dermatitis (454.1)  Acne (706.1)  CTS (Carpal Tunnel Syndrome) (354.0)  Increased BMI (783.9)  Resolved  COVID-19 virus infection (0212794870):  Resolved.   Family History:    Hypertension  Father ()  Heart disease  Father ()     Procedure history:    Colonoscopy (895487883) on 2018 at 53 Years.  Comments:  1/10/2019 9:36 AM CST - Tory Hicks  Indication:  Screening.  Sedation:  MAC.  Findings:  Polyps cecum, sigmoid colon, and right colon.  Thrombosed external hemorrhoids.  Perianal skin tags.  Recommendation:  Repeat in 5 years.   Social History:        Electronic Cigarette/Vaping Assessment            Electronic Cigarette Use: Never.      Alcohol Assessment:  Current            Current, 1-2 times per week      Tobacco Assessment: Denies Tobacco Use            Never (less than 100 in lifetime)      Substance Abuse Assessment: Denies Substance Abuse      Employment and Education Assessment            Employed      Home and Environment Assessment            Marital status: .  2 children.  Living situation: Home/Independent.      Nutrition and Health Assessment                     Comments:                      12/05/2018 - Shamika Astorga                     Restrictions:  Green peppers.      Exercise and Physical Activity Assessment: Regular exercise            Exercise frequency: Daily.  Exercise type: work.        Physical Examination   Measurements from flowsheet : Measurements   12/29/2020 11:11 AM CST  Height Measured - Standard                69 in     General:  No acute distress.    Respiratory:  Respirations are non-labored.    Psychiatric:  Cooperative, Appropriate mood & affect, Normal judgment.       Impression and Plan   Diagnosis     Acute maxillary sinusitis (OWQ37-VN J01.00).     GERD (gastroesophageal reflux disease) (SYL55-LS K21.9).     Summary:  will treat sinusitis with augmentin for 10 days, continue with sudafed and mucinex  follow up if not improving    will renew his omeprazole once, encouraged him to only take it once daily rather than bid    will also refill his alfuzosin for a month.    Orders     Orders   Pharmacy:  Augmentin 875 mg-125 mg oral tablet (Prescribe): = 1 tab(s), Oral, q12 hrs, x 10 day(s), Instructions: with food or milk, # 20 tab(s), 0 Refill(s), Type: Acute, Pharmacy: [facility], 1 tab(s) Oral q12 hrs,x10 day(s),Instr:with food or milk, 69, in, 12/29/2020 11:11 AM CST, Height Measured, 247, lb, 8/27/2020 3:59 PM CDT, Weight Measured.     Orders   Pharmacy:  omeprazole 20 mg oral delayed release capsule (Prescribe): = 1 cap(s), Oral, bid, # 180 cap(s), 0 Refill(s), Type: Maintenance, Pharmacy: [facility], 1  cap(s) Oral bid, 69, in, 12/29/2020 11:11 AM CST, Height Measured, 247, lb, 8/27/2020 3:59 PM CDT, Weight Measured  omeprazole 20 mg oral delayed release capsule (Modify): = 1 cap(s), Oral, bid, # 180 cap(s), 1 Refill(s), Type: Hard Stop, Pharmacy: [facility], 69, in, 06/16/20 14:37:00 CDT, Height Measured, 242, lb, 06/16/20 14:37:00 CDT, Weight Measured.     Orders   Charges (Evaluation and Management):  99401 office outpatient visit 25 minutes (Charge) (Order): Quantity: 1, Acute maxillary sinusitis  GERD (gastroesophageal reflux disease).

## 2022-02-16 NOTE — NURSING NOTE
Comprehensive Intake Entered On:  10/28/2019 9:38 AM CDT    Performed On:  10/28/2019 9:36 AM CDT by Bethany Garcia CMA               Summary   Chief Complaint :   Pt here for stomach pain   Weight Measured :   242 lb(Converted to: 242 lb 0 oz, 109.77 kg)    Height Measured :   69 in(Converted to: 5 ft 9 in, 175.26 cm)    Body Mass Index :   35.73 kg/m2 (HI)    Body Surface Area :   2.31 m2   Systolic Blood Pressure :   128 mmHg   Diastolic Blood Pressure :   74 mmHg   Mean Arterial Pressure :   92 mmHg   Peripheral Pulse Rate :   65 bpm   Temperature Tympanic :   97.5 DegF(Converted to: 36.4 DegC)  (LOW)    Oxygen Saturation :   98 %   Bethany Garcia CMA - 10/28/2019 9:36 AM CDT   Health Status   Allergies Verified? :   Yes   Medication History Verified? :   Yes   Medical History Verified? :   Yes   Pre-Visit Planning Status :   Completed   Tobacco Use? :   Never smoker   Bethany Garcia CMA - 10/28/2019 9:36 AM CDT   Consents   Consent for Immunization Exchange :   Consent Granted   Consent for Immunizations to Providers :   Consent Granted   Bethany Garcia CMA - 10/28/2019 9:36 AM CDT   Meds / Allergies   (As Of: 10/28/2019 9:38:57 AM CDT)   Allergies (Active)   Dilantin  Estimated Onset Date:   Unspecified ; Reactions:   rash ; Created By:   Morena Barnett; Reaction Status:   Active ; Category:   Drug ; Substance:   Dilantin ; Type:   Allergy ; Updated By:   Morena Barnett; Reviewed Date:   10/28/2019 9:36 AM CDT        Medication List   (As Of: 10/28/2019 9:38:57 AM CDT)   Prescription/Discharge Order    albuterol  :   albuterol ; Status:   Prescribed ; Ordered As Mnemonic:   ProAir HFA 90 mcg/inh inhalation aerosol ; Simple Display Line:   See Instructions, INHALE TWO PUFFS BY MOUTH FOUR TIMES A DAY AS NEEDED FOR WHEEZING OF FOR SHORTNESS OF BREATH, 8.5 gm, 1 Refill(s) ; Ordering Provider:   Karson Santana MD; Catalog Code:   albuterol ; Order Dt/Tm:   5/22/2019 4:39:06 PM CDT          alfuzosin  :    alfuzosin ; Status:   Prescribed ; Ordered As Mnemonic:   alfuzosin 10 mg oral tablet, extended release ; Simple Display Line:   10 mg, 1 tab(s), po, daily, 90 tab(s), 1 Refill(s) ; Ordering Provider:   Karson Santana MD; Catalog Code:   alfuzosin ; Order Dt/Tm:   5/22/2019 4:38:41 PM CDT          desoximetasone topical  :   desoximetasone topical ; Status:   Prescribed ; Ordered As Mnemonic:   desoximetasone 0.05% topical cream ; Simple Display Line:   1 jfef, TOP, BID, 60 g, 1 Refill(s) ; Ordering Provider:   Karson Santana MD; Catalog Code:   desoximetasone topical ; Order Dt/Tm:   5/22/2019 4:39:13 PM CDT          furosemide  :   furosemide ; Status:   Prescribed ; Ordered As Mnemonic:   furosemide 20 mg oral tablet ; Simple Display Line:   20 mg, 1 tab(s), PO, Daily, PRN: for leg swelling, 90 tab(s), 1 Refill(s) ; Ordering Provider:   Karson Santana MD; Catalog Code:   furosemide ; Order Dt/Tm:   5/22/2019 4:38:52 PM CDT          methylphenidate  :   methylphenidate ; Status:   Prescribed ; Ordered As Mnemonic:   methylphenidate 20 mg oral tablet ; Simple Display Line:   1 tab(s), Oral, daily, 30 tab(s), 0 Refill(s) ; Ordering Provider:   Karson Santana MD; Catalog Code:   methylphenidate ; Order Dt/Tm:   8/29/2019 3:45:46 PM CDT          montelukast  :   montelukast ; Status:   Prescribed ; Ordered As Mnemonic:   montelukast 10 mg oral tablet ; Simple Display Line:   10 mg, 1 tab(s), Oral, daily, 90 tab(s), 1 Refill(s) ; Ordering Provider:   Karson Santana MD; Catalog Code:   montelukast ; Order Dt/Tm:   5/22/2019 4:38:28 PM CDT          omeprazole  :   omeprazole ; Status:   Prescribed ; Ordered As Mnemonic:   omeprazole 20 mg oral delayed release capsule ; Simple Display Line:   1 cap(s), Oral, bid, 180 cap(s), 1 Refill(s) ; Ordering Provider:   Karson Santana MD; Catalog Code:   omeprazole ; Order Dt/Tm:   5/16/2019 4:18:02 PM CDT

## 2022-02-16 NOTE — TELEPHONE ENCOUNTER
Entered by Oksana Davis CMA on November 11, 2020 11:27:36 AM CST  PCP:   [facility]    Medication:   Methylphenidate 20mg  Last Filled:  6/16/2020    Quantity:  30  Refills:  0    Date of last office visit and reason:   8/27/2020 f.u with DARYN  Date of last labs pertaining to condition:  n/a    Note:  Please advise on refill.     Return to Clinic order placed?  n/a    Resource:   [facility] [employer]  Phone:   [phone]      ------------------------------------------  From: [facility]  To: Karson Santana MD  Sent: November 10, 2020 3:25:26 PM CST  Subject: Medication Management  Due: November 7, 2020 12:21:51 AM CST     ** On Hold Pending Signature **     Drug: methylphenidate (methylphenidate 20 mg oral tablet), TAKE ONE TABLET BY MOUTH EVERY DAY  Quantity: 30 unknown unit  Days Supply: 30  Refills: 0  Substitutions Allowed  Notes from Pharmacy:     Dispensed Drug: methylphenidate (methylphenidate 20 mg oral tablet), TAKE ONE TABLET BY MOUTH EVERY DAY  Quantity: 30 unknown unit  Days Supply: 30  Refills: 0  Substitutions Allowed  Notes from Pharmacy:  ---------------------------------------------------------------  From: Oksana Davis CMA (eRx Pool ([facility]))   To: [facility] ([facility])   ;     Sent: 11/11/2020 11:27:49 AM CST  Subject: FW: Medication Management   Due Date/Time: 11/11/2020 3:25:00 PM CST---------------------  From: Bethany Garcia CMA   To: [facility]    Sent: 11/11/2020 3:37:57 PM CST  Subject: FW: Medication Management     ** Not Approved: Refill not appropriate, filled and sent by provider separately **  methylphenidate (METHYLPHENIDATE HCL 20MG TABS)  TAKE ONE TABLET BY MOUTH EVERY DAY  Qty:  30 unknown unit        Days Supply:  30        Refills:  0          Substitutions Allowed     Route To Pharmacy - [facility]   Signed by Bethany Garcia CMA

## 2022-02-16 NOTE — NURSING NOTE
Comprehensive Intake Entered On:  8/27/2020 4:02 PM CDT    Performed On:  8/27/2020 3:59 PM CDT by Kadi Sierra CMA               Summary   Chief Complaint :   1.  F/u ADD  2.  req Kenalog shot for allergies - typically gets in Spring and Fall - allergy to Ragweed   Weight Measured :   247 lb(Converted to: 247 lb 0 oz, 112.04 kg)    Height Measured :   69 in(Converted to: 5 ft 9 in, 175.26 cm)    Body Mass Index :   36.47 kg/m2 (HI)    Body Surface Area :   2.33 m2   Systolic Blood Pressure :   130 mmHg   Diastolic Blood Pressure :   88 mmHg (HI)    Mean Arterial Pressure :   102 mmHg   Peripheral Pulse Rate :   80 bpm   Temperature Tympanic :   98.5 DegF(Converted to: 36.9 DegC)    Kadi Sierra CMA - 8/27/2020 3:59 PM CDT   Health Status   Allergies Verified? :   Yes   Medication History Verified? :   Yes   Medical History Verified? :   Yes   Pre-Visit Planning Status :   Completed   Kadi Sierra CMA - 8/27/2020 3:59 PM CDT   ID Risk Screen   Recent Travel History :   Last travel within 14 days   Recent Travel Location :   [place]   Family Member Travel History :   No recent travel   Other Exposure to Infectious Disease :   Unknown   Kadi Sierra CMA - 8/27/2020 3:59 PM CDT
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
## 2022-02-16 NOTE — LETTER
(Inserted Image. Unable to display)   [address] [facility] [address]  November 05, 2019      MIMI MART  [address] [place] [address] [phone]        Dear MIMI,     Thank you for selecting [facility] for your healthcare needs. Below you will find the results of the recent tests done at our clinic.      The stool tests were all NORMAL.  The next step is the Gall Bladder Ultrasound.  Let me know if this has NOT been scheduled.  If the Ultrasound is normal I would then recommend a Gastroenterology consult and possible scope test of the stomach.      Result Name Current Result   Clostridium difficile Toxin  See comment 10/31/2019   Helicobacter pylori Ag Stool  See comment 10/31/2019   Culture Campylobacter  See comment 10/31/2019   Culture Salmonella/Shigella  See comment 10/31/2019   Shiga Toxin  See comment 10/31/2019   Trichrome 1  See comment 10/31/2019   Cryptosporidium Ag  See comment 10/31/2019   Giardia Ag  See comment 10/31/2019       Please contact my practice at [phone]  if you have any questions or concerns.     Sincerely,        Karson Santana MD          What do your labs mean?  Below is a glossary of commonly ordered labs:  LDL   Bad Cholesterol   HDL   Good Cholesterol  AST/ALT   Liver Function   Cr/Creatinine   Kidney Function  Microalbumin   Kidney Function  BUN   Kidney Function  PSA   Prostate    TSH   Thyroid Hormone  HgbA1c   Diabetes Test   Hgb (Hemoglobin)   Red Blood Cells

## 2022-02-16 NOTE — TELEPHONE ENCOUNTER
---------------------  From: Bethany Garcia CMA   To: Karson Santana MD;     Sent: 5/16/2019 4:33:45 PM CDT  Subject: refill request-methylphenidate     PCP:   [facility]    Medication:   methylphenidate  Last Filled:  3/28/19    Quantity:  30  Refills:  0      Date of last office visit and reason:   12/4/18      Note:  received faxed request from pharmacy    [facility]    Resource:   _  Phone:   _  ** Submitted: **  Order:methylphenidate (methylphenidate 20 mg oral tablet)  1 tab(s)  Oral  daily  Qty:  30 tab(s)        Refills:  0          Route To Pharmacy - [facility]    Signed by Karson Santana MD  5/20/2019 11:22:00 AM

## 2022-02-16 NOTE — NURSING NOTE
Comprehensive Intake Entered On:  8/29/2019 3:33 PM CDT    Performed On:  8/29/2019 3:30 PM CDT by Bethany Garcia CMA               Summary   Chief Complaint :   Pt here for allergies and med ck   Weight Measured :   242 lb(Converted to: 242 lb 0 oz, 109.77 kg)    Height Measured :   69 in(Converted to: 5 ft 9 in, 175.26 cm)    Body Mass Index :   35.73 kg/m2 (HI)    Body Surface Area :   2.31 m2   Systolic Blood Pressure :   132 mmHg (HI)    Diastolic Blood Pressure :   82 mmHg (HI)    Mean Arterial Pressure :   99 mmHg   Peripheral Pulse Rate :   70 bpm   Oxygen Saturation :   98 %   Bethany Garcia CMA - 8/29/2019 3:30 PM CDT   Health Status   Allergies Verified? :   Yes   Medication History Verified? :   Yes   Medical History Verified? :   Yes   Pre-Visit Planning Status :   Completed   Tobacco Use? :   Never smoker   Bethany Garcia CMA - 8/29/2019 3:30 PM CDT   Consents   Consent for Immunization Exchange :   Consent Granted   Consent for Immunizations to Providers :   Consent Granted   Bethany Garcia CMA - 8/29/2019 3:30 PM CDT   Meds / Allergies   (As Of: 8/29/2019 3:33:04 PM CDT)   Allergies (Active)   Dilantin  Estimated Onset Date:   Unspecified ; Reactions:   rash ; Created By:   Morena Barnett; Reaction Status:   Active ; Category:   Drug ; Substance:   Dilantin ; Type:   Allergy ; Updated By:   Morena Barnett; Reviewed Date:   8/29/2019 3:30 PM CDT        Medication List   (As Of: 8/29/2019 3:33:04 PM CDT)   Prescription/Discharge Order    albuterol  :   albuterol ; Status:   Prescribed ; Ordered As Mnemonic:   ProAir HFA 90 mcg/inh inhalation aerosol ; Simple Display Line:   See Instructions, INHALE TWO PUFFS BY MOUTH FOUR TIMES A DAY AS NEEDED FOR WHEEZING OF FOR SHORTNESS OF BREATH, 8.5 gm, 1 Refill(s) ; Ordering Provider:   Karson Santana MD; Catalog Code:   albuterol ; Order Dt/Tm:   5/22/2019 4:39:06 PM          alfuzosin  :   alfuzosin ; Status:   Prescribed ; Ordered As Mnemonic:    alfuzosin 10 mg oral tablet, extended release ; Simple Display Line:   10 mg, 1 tab(s), po, daily, 90 tab(s), 1 Refill(s) ; Ordering Provider:   Karson Santana MD; Catalog Code:   alfuzosin ; Order Dt/Tm:   5/22/2019 4:38:41 PM          desoximetasone topical  :   desoximetasone topical ; Status:   Prescribed ; Ordered As Mnemonic:   desoximetasone 0.05% topical cream ; Simple Display Line:   1 jeff, TOP, BID, 60 g, 1 Refill(s) ; Ordering Provider:   Karson Santana MD; Catalog Code:   desoximetasone topical ; Order Dt/Tm:   5/22/2019 4:39:13 PM          furosemide  :   furosemide ; Status:   Prescribed ; Ordered As Mnemonic:   furosemide 20 mg oral tablet ; Simple Display Line:   20 mg, 1 tab(s), PO, Daily, PRN: for leg swelling, 90 tab(s), 1 Refill(s) ; Ordering Provider:   Karson Santana MD; Catalog Code:   furosemide ; Order Dt/Tm:   5/22/2019 4:38:52 PM          methylphenidate  :   methylphenidate ; Status:   Prescribed ; Ordered As Mnemonic:   methylphenidate 20 mg oral tablet ; Simple Display Line:   1 tab(s), Oral, daily, 30 tab(s), 0 Refill(s) ; Ordering Provider:   Karson Santana MD; Catalog Code:   methylphenidate ; Order Dt/Tm:   5/20/2019 11:22:58 AM          montelukast  :   montelukast ; Status:   Prescribed ; Ordered As Mnemonic:   montelukast 10 mg oral tablet ; Simple Display Line:   10 mg, 1 tab(s), Oral, daily, 90 tab(s), 1 Refill(s) ; Ordering Provider:   Karson Santana MD; Catalog Code:   montelukast ; Order Dt/Tm:   5/22/2019 4:38:28 PM          omeprazole  :   omeprazole ; Status:   Prescribed ; Ordered As Mnemonic:   omeprazole 20 mg oral delayed release capsule ; Simple Display Line:   1 cap(s), Oral, bid, 180 cap(s), 1 Refill(s) ; Ordering Provider:   Karson Santana MD; Catalog Code:   omeprazole ; Order Dt/Tm:   5/16/2019 4:18:02 PM

## 2022-02-16 NOTE — NURSING NOTE
PA faxed to [phone] for Testosterone Cypionate 200mg/mL.  Will wait for approval/denial and then will notify [facility] of outcomeI have left 2 messages for Clare,  at [facility] to return my call regarding the status of pt's PA.  She has not returned my call.  Pt is wanting to start injections this week and being that  is closed on Friday, he would like to get it Thurs.  Pt notifed that I am waiting for a call back from ins.Ins requires 2 total Testosterone lab levels before approving.   After pt has his 2nd level drawn this month, the results need to be sent to [facility] for appeal process.    Left message for pt to return my call to relay this criteria

## 2022-02-16 NOTE — NURSING NOTE
Received call from [employer] that appeal has overturned decline decision and now Testosterone will be covered 9/7/17 through 9/7/18When I receive faxed report I will then fax to pt's pharmacy

## 2022-02-16 NOTE — PROGRESS NOTES
Patient:   MIMI MART            MRN: [id number]            FIN: [id number]               Age:   52 years     Sex:  Male     :  1965   Associated Diagnoses:   Allergic Rhinitis; Low serum testosterone   Author:   Karson Hinds PA-C      Chief Complaint   2017 2:19 PM CDT    Pt here for labwork and kenalog inj        History of Present Illness   Chief complaint and symptoms noted above and confirmed with patient   here today for repeat lab test for testosterone    also has seasonal rhinitis and typically will get kenalog injection this time of year  he is singuair and antihistamine    also having daily headaches, using aspirin  may have sleep apnea,  he has discussed having a sleep study done and he is encouraged to pursue         Review of Systems   Ear/Nose/Mouth/Throat:  Nasal congestion.    Respiratory:  Cough.       Health Status   Allergies:    Allergic Reactions (Selected)  Severity Not Documented  Dilantin (Rash)   Medications:  (Selected)   Prescriptions  Prescribed  Allegra 180 mg oral tablet: 1 tab(s) ( 180 mg ), po, daily, # 30 tab(s), 11 Refill(s), Type: Maintenance, Pharmacy: [facility], 1 tab(s) po daily  ProAir HFA 90 mcg/inh inhalation aerosol: See Instructions, Instructions: INHALE TWO PUFFS BY MOUTH FOUR TIMES A DAY AS NEEDED FOR WHEEZING OF FOR SHORTNESS OF BREATH, # 8.5 gm, 1 Refill(s), Type: Soft Stop, Pharmacy: [facility], INHALE TWO PUFFS BY MOUTH FOUR TIMES A DAY AS NEEDED FOR...  Singulair 10 mg oral tablet: 1 tab(s) ( 10 mg ), PO, qPM, PRN: for allergy symptoms, # 30 tab(s), 11 Refill(s), Type: Maintenance, Pharmacy: [facility], 1 tab(s) po qpm,PRN:for allergy symptoms  Sudafed 12-Hour 120 mg oral tablet, extended release: 1 tab(s) ( 120 mg ), po, q12 hrs, Instructions: may substitute 24 hour formulation, # 60 tab(s), 11 Refill(s), Type: Maintenance, Pharmacy: [facility], 1 tab(s) po q12 hrs,Instr:may substitute 24 hour formulation  alfuzosin 10 mg  oral tablet, extended release: 1 tab(s) ( 10 mg ), po, daily, # 30 tab(s), 11 Refill(s), Type: Maintenance, Pharmacy: [facility], 1 tab(s) po daily  cyclobenzaprine 10 mg oral tablet: 1 tab(s), PO, q6hr, PRN: for spasm, # 30 tab(s), 5 Refill(s), Type: Maintenance, Pharmacy: [facility], 1 tab(s) po q6 hr,PRN:for spasm  doxycycline hyclate 100 mg oral tablet: 1 tab(s) ( 100 mg ), PO, Daily, # 30 tab(s), 11 Refill(s), Type: Maintenance, Pharmacy: [facility], 1 tab(s) po daily,x30 day(s)  furosemide 20 mg oral tablet: 1 tab(s) ( 20 mg ), PO, Daily, PRN: for leg swelling, # 30 tab(s), 11 Refill(s), Type: Maintenance, Pharmacy: [facility], 1 tab(s) po daily,PRN:for leg swelling  methylphenidate 20 mg oral tablet: 1 tab(s) ( 20 mg ), PO, daily, # 30 tab(s), 0 Refill(s), Type: Maintenance  omeprazole 20 mg oral delayed release capsule: 1 cap(s) ( 20 mg ), po, bid, # 60 cap(s), 11 Refill(s), Type: Maintenance, Pharmacy: [facility], 1 cap(s) po bid  testosterone enanthate 200 mg/mL intramuscular solution: 1 mL ( 200 mg ), IM, q2wk, # 10 mL, 2 Refill(s), Type: Maintenance, Pharmacy: [facility], 1 mL im q2 wks   Problem list:    All Problems (Selected)  Onychomycosis / ICD-9-.1 / Confirmed  Lower leg edema / SNOMED CT 176009943 / Confirmed  ADD (attention deficit disorder) / SNOMED CT 7549170403 / Confirmed  Obesity / ICD-9-.00 / Probable  GERD (gastroesophageal reflux disease) / SNOMED CT 626795936 / Confirmed  CTS (Carpal Tunnel Syndrome) / ICD-9-.0 / Confirmed  BPH (benign prostatic hypertrophy) / SNOMED CT 261275111 / Confirmed  Stasis Dermatitis / ICD-9-.1 / Confirmed  External Hemorrhoids / ICD-9-.3 / Confirmed  Allergic Rhinitis / ICD-9-.9 / Confirmed  Asthma / ICD-9-.90 / Confirmed  Acne / ICD-9-.1 / Confirmed  Osteoarthritis of Knee / ICD-9-.96 / Confirmed  Chronic Lumbar Pain / ICD-9-.2 / Confirmed  Increased BMI / ICD-9-CM  783.9 / Confirmed  Hyperlipidemia / SNOMED CT 06373201 / Confirmed      Histories   Past Medical History:    Active  Onychomycosis (110.1): Onset on 2008 at 43 years.  Comments:  2010 CST 10:46 AM CST - Barnett Morena BLAKE  left small nail  Osteoarthritis of Knee (715.96)  Chronic Lumbar Pain (724.2)  Allergic Rhinitis (477.9)  Asthma (493.90)  External Hemorrhoids (455.3)  Stasis Dermatitis (454.1)  Acne (706.1)  CTS (Carpal Tunnel Syndrome) (354.0)  Increased BMI (783.9)   Family History:    Hypertension  Father ()  Heart disease  Father ()     Procedure history:    No active procedure history items have been selected or recorded.   Social History:        Alcohol Assessment: Current            Current, 1-2 times per week      Tobacco Assessment: Denies Tobacco Use      Exercise and Physical Activity Assessment: Occasional exercise        Physical Examination   Vital Signs   2017 2:19 PM CDT Temperature Tympanic 97.0 DegF  LOW    Peripheral Pulse Rate 67 bpm    Systolic Blood Pressure 102 mmHg    Diastolic Blood Pressure 68 mmHg    Mean Arterial Pressure 79 mmHg    BP Site Right arm    Oxygen Saturation 97 %      Measurements from flowsheet : Measurements   2017 2:19 PM CDT Height Measured - Standard 69 in    Weight Measured - Standard 255.4 lb    BSA 2.37 m2    Body Mass Index 37.71 kg/m2      General:  No acute distress.    HENT:  Tympanic membranes are clear, No pharyngeal erythema, No sinus tenderness.    Neck:  Supple, Non-tender, No lymphadenopathy.    Respiratory:  Lungs are clear to auscultation.    Cardiovascular:  Normal rate, Regular rhythm, No murmur.    Gastrointestinal:  Soft, Non-distended, Normal bowel sounds, No organomegaly, some epigastric tenderness.       Impression and Plan   Diagnosis     Allergic Rhinitis (LOI63-KG J30.1).     Low serum testosterone (EMY98-YW E29.1).     Summary:  will given 80 mg kenalog im and check for total testosterone.    Orders      Orders   Lab (Gen Lab  Reference Lab):  Testosterone, Total (Males)* ([facility]) (Order): Specimen Type: Serum, Collection Date: 8/31/2017 2:39 PM CDT.     Orders   Charges (Evaluation and Management):  65560 office outpatient visit 15 minutes (Charge) (Order): Quantity: 1, Allergic Rhinitis  Low serum testosterone.

## 2022-02-16 NOTE — PROGRESS NOTES
Patient:   MIMI MART            MRN: [id number]            FIN: [id number]               Age:   55 years     Sex:  Male     :  1965   Associated Diagnoses:   Allergic rhinitis; GERD (gastroesophageal reflux disease); ADD (attention deficit disorder)   Author:   Karson Santana MD      Impression and Plan   Diagnosis     Allergic rhinitis (QRP69-BM J30.9).     Course:  Worsening.    Summary:  patient informed of the risk of taking a systemic steroid during the Corona Virus Pandemic..    Orders     Orders   Charges (Evaluation and Management):  62216 office outpatient visit 25 minutes (Charge) (Order): Quantity: 1, Allergic rhinitis.     Orders (Selected)   Outpatient Orders  Completed  Kenalog-80: 80 mg, IM, once.     Diagnosis     GERD (gastroesophageal reflux disease) (IPY97-YX K21.9).     Course:  Improving.    Orders     Orders (Selected)   Prescriptions  Prescribed  omeprazole 20 mg oral delayed release capsule: = 1 cap(s), Oral, bid, # 180 cap(s), 1 Refill(s), Type: Maintenance, Pharmacy: [facility], 1 cap(s) Oral bid, 69, in, 20 14:37:00 CDT, Height Measured, 242, lb, 20 14:37:00 CDT, Weight Measured.     Diagnosis     ADD (attention deficit disorder) (EFS86-QP F98.8).     Course:  Progressing as expected.    Orders     Orders (Selected)   Prescriptions  Prescribed  methylphenidate 20 mg oral tablet: = 1 tab(s), Oral, daily, # 30 tab(s), 0 Refill(s), Type: Soft Stop, Pharmacy: [facility], 1 tab(s) Oral daily, 69, in, 20 14:37:00 CDT, Height Measured, 242, lb, 20 14:37:00 CDT, Weight Measured.        Visit Information      Date of Service: 2020 02:39 pm  Performing Location: [facility]  Encounter#: [id number]      Primary Care Provider (PCP):  Karson Santana MD    NPI# [id number]      Referring Provider:  Karson Santana MD    NPI# [id number]      Chief Complaint   2020 2:37 PM CDT    Pt here for kenalog shot for allergies        History of  Present Illness             The patient presents with Needs Methylphenidate refilled for treatment of Adult ADD.  Current regimen working well..               The patient presents with rhinorrhea.  The rhinorrhea is from both nostrils.  The rhinorrhea is described as clear.  The severity of the rhinorrhea is severe.  The rhinorrhea is constant and is worsening.  The context of the rhinorrhea: occurred after exposure to allergens.  Exacerbating factors consist of pollen.  Relieving factors consist of allergen avoidance and medication.  Associated symptoms consist of itchy eyes, nasal congestion, sneezing, denies fever, denies headache, denies cough, denies ear pain, denies facial pain and denies sore throat.        Review of Systems   Constitutional:  Negative.    Eye:  Negative.    Ear/Nose/Mouth/Throat:  Negative except as documented in history of present illness.    Respiratory:  Negative.    Cardiovascular:  Negative.    Gastrointestinal:  Negative.    Genitourinary:  Negative.    Hematology/Lymphatics:  Negative.    Endocrine:  Negative.    Immunologic:  Negative.    Musculoskeletal:  Negative.    Integumentary:  Negative.    Neurologic:  Negative.    Psychiatric:  Negative.    All other systems reviewed and negative      Health Status   Allergies:    Allergic Reactions (Selected)  Severity Not Documented  Dilantin (Rash)   Medications:  (Selected)   Prescriptions  Prescribed  ProAir HFA 90 mcg/inh inhalation aerosol: See Instructions, Instructions: INHALE TWO PUFFS BY MOUTH FOUR TIMES A DAY AS NEEDED FOR WHEEZING OF FOR SHORTNESS OF BREATH, # 8.5 gm, 1 Refill(s), Type: Soft Stop, Pharmacy: [facility], INHALE TWO PUFFS BY MOUTH FOUR TIMES A DAY AS NEEDED FOR...  alfuzosin 10 mg oral tablet, extended release: = 1 tab(s), Oral, daily, # 90 tab(s), 1 Refill(s), Type: Maintenance, Pharmacy: [facility], 1 tab(s) Oral daily, 69, in, 06/16/20 14:37:00 CDT, Height Measured, 242, lb, 06/16/20 14:37:00 CDT,  Weight Measured  cyclobenzaprine 10 mg oral tablet: = 1 tab(s), PO, q6hr, PRN: for spasm, # 30 tab(s), 5 Refill(s), Type: Maintenance, other reason (Rx)  desoximetasone 0.05% topical cream: 1 jeff, TOP, BID, # 60 g, 1 Refill(s), Type: Maintenance, Pharmacy: [facility], 1 jeff Topical bid  furosemide 20 mg oral tablet: = 1 tab(s) ( 20 mg ), PO, Daily, PRN: for leg swelling, # 90 tab(s), 1 Refill(s), Type: Maintenance, Pharmacy: [facility], 1 tab(s) Oral daily,PRN:for leg swelling  methylphenidate 20 mg oral tablet: = 1 tab(s), Oral, daily, # 30 tab(s), 0 Refill(s), Type: Soft Stop, Pharmacy: [facility], 1 tab(s) Oral daily, 69, in, 06/16/20 14:37:00 CDT, Height Measured, 242, lb, 06/16/20 14:37:00 CDT, Weight Measured  montelukast 10 mg oral tablet: = 1 tab(s), Oral, daily, # 90 tab(s), 1 Refill(s), Type: Maintenance, Pharmacy: [facility], 1 tab(s) Oral daily, 69, in, 06/16/20 14:37:00 CDT, Height Measured, 242, lb, 06/16/20 14:37:00 CDT, Weight Measured  omeprazole 20 mg oral delayed release capsule: = 1 cap(s), Oral, bid, # 180 cap(s), 1 Refill(s), Type: Maintenance, Pharmacy: [facility], 1 cap(s) Oral bid, 69, in, 06/16/20 14:37:00 CDT, Height Measured, 242, lb, 06/16/20 14:37:00 CDT, Weight Measured,    Medications          *denotes recorded medication          ProAir HFA 90 mcg/inh inhalation aerosol: See Instructions, INHALE TWO PUFFS BY MOUTH FOUR TIMES A DAY AS NEEDED FOR WHEEZING OF FOR SHORTNESS OF BREATH, 8.5 gm, 1 Refill(s).          alfuzosin 10 mg oral tablet, extended release: 1 tab(s), Oral, daily, 90 tab(s), 1 Refill(s).          cyclobenzaprine 10 mg oral tablet: 1 tab(s), PO, q6hr, PRN: for spasm, 30 tab(s), 5 Refill(s).          desoximetasone 0.05% topical cream: 1 jeff, TOP, BID, 60 g, 1 Refill(s).          furosemide 20 mg oral tablet: 20 mg, 1 tab(s), PO, Daily, PRN: for leg swelling, 90 tab(s), 1 Refill(s).          methylphenidate 20 mg oral tablet: 1 tab(s), Oral,  daily, 30 tab(s), 0 Refill(s).          montelukast 10 mg oral tablet: 1 tab(s), Oral, daily, 90 tab(s), 1 Refill(s).          omeprazole 20 mg oral delayed release capsule: 1 cap(s), Oral, bid, 180 cap(s), 1 Refill(s).       Problem list:    All Problems  Acne / ICD-9-.1 / Confirmed  ADD (attention deficit disorder) / SNOMED CT 2598035383 / Confirmed  Adenomatous colon polyp / SNOMED CT 5444640545 / Confirmed  Allergic Rhinitis / ICD-9-.9 / Confirmed  Asthma / ICD-9-.90 / Confirmed  BPH (benign prostatic hypertrophy) / SNOMED CT 060397076 / Confirmed  Chronic Lumbar Pain / ICD-9-.2 / Confirmed  CTS (Carpal Tunnel Syndrome) / ICD-9-.0 / Confirmed  External Hemorrhoids / ICD-9-.3 / Confirmed  GERD (gastroesophageal reflux disease) / SNOMED CT 224915552 / Confirmed  Hyperlipidemia / SNOMED CT 36552603 / Confirmed  Increased BMI / ICD-9-.9 / Confirmed  Low testosterone / SNOMED CT 5474089371 / Confirmed  Lower leg edema / SNOMED CT 284268933 / Confirmed  Male hypogonadism / SNOMED CT 40758416 / Confirmed  Obesity / ICD-9-.00 / Probable  Onychomycosis / ICD-9-.1 / Confirmed  Osteoarthritis of Knee / ICD-9-.96 / Confirmed  Stasis Dermatitis / ICD-9-.1 / Confirmed  Canceled: Hyperlipemia / ICD-9-.4      Histories   Past Medical History:    Active  Onychomycosis (110.1): Onset on 2008 at 43 years.  Comments:  2010 CST 10:46 AM CST - Barnett CMA, Morena  left small nail  Osteoarthritis of Knee (715.96)  Chronic Lumbar Pain (724.2)  Allergic Rhinitis (477.9)  Asthma (493.90)  External Hemorrhoids (455.3)  Stasis Dermatitis (454.1)  Acne (706.1)  CTS (Carpal Tunnel Syndrome) (354.0)  Increased BMI (783.9)   Family History:    Hypertension  Father ()  Heart disease  Father ()     Procedure history:    Colonoscopy (SNOMED CT 068565954) performed by Duarte Dean MD on 2018 at 53 Years.  Comments:  1/10/2019 9:36 AM CST - Fabiola  , Tory  Indication:  Screening.  Sedation:  MAC.  Findings:  Polyps cecum, sigmoid colon, and right colon.  Thrombosed external hemorrhoids.  Perianal skin tags.  Recommendation:  Repeat in 5 years.   Social History:        Alcohol Assessment: Current            Current, 1-2 times per week      Tobacco Assessment: Denies Tobacco Use      Substance Abuse Assessment: Denies Substance Abuse      Employment and Education Assessment            Employed      Home and Environment Assessment            Marital status: .  2 children.  Living situation: Home/Independent.      Nutrition and Health Assessment                     Comments:                      12/05/2018 - Shamika Astorga                     Restrictions:  Green peppers.      Exercise and Physical Activity Assessment: Regular exercise            Exercise frequency: Daily.  Exercise type: work.        Physical Examination   Vital Signs   6/16/2020 2:37 PM CDT Temperature Tympanic 98.2 DegF    Peripheral Pulse Rate 62 bpm    Systolic Blood Pressure 132 mmHg    Diastolic Blood Pressure 78 mmHg    Mean Arterial Pressure 96 mmHg    Oxygen Saturation 97 %      Measurements from flowsheet : Measurements   6/16/2020 2:37 PM CDT Height Measured - Standard 69 in    Weight Measured - Standard 242 lb    BSA 2.31 m2    Body Mass Index 35.73 kg/m2  HI      Eye:  Pupils are equal, round and reactive to light, Extraocular movements are intact, Normal conjunctiva.    HENT:  Normocephalic, Normal hearing, Oral mucosa is moist, No pharyngeal erythema, No sinus tenderness, nasal mucosa congested.    Neck:  Supple, No lymphadenopathy, No thyromegaly.    Respiratory:  Lungs are clear to auscultation, Respirations are non-labored, Breath sounds are equal.    Cardiovascular:  Normal rate, Regular rhythm, Normal peripheral perfusion, No edema.    Genitourinary:       Testes: Bilateral, Within normal limits.    Integumentary:  Warm, Dry, Pink, No rash.    Neurologic:  Alert,  Oriented, No neurologic tics noted on examination.    Psychiatric:  Cooperative, Appropriate mood & affect, Normal judgment.
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
## 2022-02-16 NOTE — PROGRESS NOTES
Patient:   MIMI MART            MRN: [id number]            FIN: [id number]               Age:   52 years     Sex:  Male     :  1965   Associated Diagnoses:   Male hypogonadism   Author:   Karson Santana MD      Impression and Plan   Diagnosis     Male hypogonadism (MTK62-DI E29.1).     Course:  Worsening.    Plan:  Recheck testosterone level in one month.    Orders     Orders   Charges (Evaluation and Management):  22885 office outpatient visit 15 minutes (Charge) (Order): Quantity: 1, Male hypogonadism.     Orders (Selected)   Prescriptions  Prescribed  testosterone enanthate 200 mg/mL intramuscular solution: 1 mL ( 200 mg ), IM, q2wk, # 10 mL, 2 Refill(s), Type: Maintenance, Pharmacy: [facility], 1 mL im q2 wks.        Visit Information   Visit type:  Scheduled follow-up.    Accompanied by:  No one.    Source of history:  Self.    Referral source:  Self.    History limitation:  None.       Chief Complaint   2017 3:26 PM CDT    Pt. here for F/U per Roque        History of Present Illness             The patient presents with here for followup regarding low testosterone level.        Review of Systems   Constitutional:  Weakness, Fatigue.    Eye:  Negative.    Ear/Nose/Mouth/Throat:  Negative.    Respiratory:  Negative.    Cardiovascular:  Negative.    Gastrointestinal:  Negative.    Genitourinary:  Negative.    Hematology/Lymphatics:  Negative.    Endocrine:  Negative, decreased libido.    Immunologic:  Negative.    Musculoskeletal:  Negative.    Integumentary:  Negative.    Neurologic:  Negative.    Psychiatric:  Negative.          All other systems reviewed and negative      Health Status   Allergies:    Allergic Reactions (Selected)  Severity Not Documented  Dilantin (Rash)   Medications:  (Selected)   Prescriptions  Prescribed  Allegra 180 mg oral tablet: 1 tab(s) ( 180 mg ), po, daily, # 30 tab(s), 11 Refill(s), Type: Maintenance, Pharmacy: [facility], 1 tab(s) po daily  ProAir HFA 90  mcg/inh inhalation aerosol: See Instructions, Instructions: INHALE TWO PUFFS BY MOUTH FOUR TIMES A DAY AS NEEDED FOR WHEEZING OF FOR SHORTNESS OF BREATH, # 8.5 gm, 1 Refill(s), Type: Soft Stop, Pharmacy: [facility], INHALE TWO PUFFS BY MOUTH FOUR TIMES A DAY AS NEEDED FOR...  Singulair 10 mg oral tablet: 1 tab(s) ( 10 mg ), PO, qPM, PRN: for allergy symptoms, # 30 tab(s), 11 Refill(s), Type: Maintenance, Pharmacy: [facility], 1 tab(s) po qpm,PRN:for allergy symptoms  Sudafed 12-Hour 120 mg oral tablet, extended release: 1 tab(s) ( 120 mg ), po, q12 hrs, Instructions: may substitute 24 hour formulation, # 60 tab(s), 11 Refill(s), Type: Maintenance, Pharmacy: [facility], 1 tab(s) po q12 hrs,Instr:may substitute 24 hour formulation  alfuzosin 10 mg oral tablet, extended release: 1 tab(s) ( 10 mg ), po, daily, # 30 tab(s), 11 Refill(s), Type: Maintenance, Pharmacy: [facility], 1 tab(s) po daily  cyclobenzaprine 10 mg oral tablet: 1 tab(s), PO, q6hr, PRN: for spasm, # 30 tab(s), 5 Refill(s), Type: Maintenance, Pharmacy: [facility], 1 tab(s) po q6 hr,PRN:for spasm  doxycycline hyclate 100 mg oral tablet: 1 tab(s) ( 100 mg ), PO, Daily, # 30 tab(s), 11 Refill(s), Type: Maintenance, Pharmacy: [facility], 1 tab(s) po daily,x30 day(s)  furosemide 20 mg oral tablet: 1 tab(s) ( 20 mg ), PO, Daily, PRN: for leg swelling, # 30 tab(s), 11 Refill(s), Type: Maintenance, Pharmacy: [facility], 1 tab(s) po daily,PRN:for leg swelling  methylphenidate 20 mg oral tablet: 1 tab(s) ( 20 mg ), PO, daily, # 30 tab(s), 0 Refill(s), Type: Maintenance  omeprazole 20 mg oral delayed release capsule: 1 cap(s) ( 20 mg ), po, bid, # 60 cap(s), 11 Refill(s), Type: Maintenance, Pharmacy: [facility], 1 cap(s) po bid  testosterone enanthate 200 mg/mL intramuscular solution: 1 mL ( 200 mg ), IM, q2wk, # 10 mL, 2 Refill(s), Type: Maintenance, Pharmacy: [facility], 1 mL im q2 wks   Problem list:    All  Problems (Selected)  Acne / ICD-9-.1 / Confirmed  ADD (attention deficit disorder) / SNOMED CT 2899850926 / Confirmed  Allergic Rhinitis / ICD-9-.9 / Confirmed  Asthma / ICD-9-.90 / Confirmed  BPH (benign prostatic hypertrophy) / SNOMED CT 581118101 / Confirmed  Chronic Lumbar Pain / ICD-9-.2 / Confirmed  CTS (Carpal Tunnel Syndrome) / ICD-9-.0 / Confirmed  External Hemorrhoids / ICD-9-.3 / Confirmed  GERD (gastroesophageal reflux disease) / SNOMED CT 415341238 / Confirmed  Hyperlipidemia / SNOMED CT 57626529 / Confirmed  Increased BMI / ICD-9-.9 / Confirmed  Lower leg edema / SNOMED CT 951666449 / Confirmed  Obesity / ICD-9-.00 / Probable  Onychomycosis / ICD-9-.1 / Confirmed  Osteoarthritis of Knee / ICD-9-.96 / Confirmed  Stasis Dermatitis / ICD-9-.1 / Confirmed      Histories   Past Medical History:    Active  Onychomycosis (110.1): Onset on 2008 at 43 years.  Comments:  2010 CST 10:46 AM CST - Barnett CMA, [place]  left small nail  Osteoarthritis of Knee (715.96)  Chronic Lumbar Pain (724.2)  Allergic Rhinitis (477.9)  Asthma (493.90)  External Hemorrhoids (455.3)  Stasis Dermatitis (454.1)  Acne (706.1)  CTS (Carpal Tunnel Syndrome) (354.0)  Increased BMI (783.9)   Family History:    Hypertension  Father ()  Heart disease  Father ()     Procedure history:    No active procedure history items have been selected or recorded.   Social History:        Alcohol Assessment: Current            Current, 1-2 times per week      Tobacco Assessment: Denies Tobacco Use      Exercise and Physical Activity Assessment: Occasional exercise        Physical Examination   Vital Signs   2017 3:26 PM CDT Temperature Tympanic 98.7 DegF    Peripheral Pulse Rate 72 bpm    Pulse Site Radial artery    HR Method Manual    Systolic Blood Pressure 118 mmHg    Diastolic Blood Pressure 72 mmHg    Mean Arterial Pressure 87 mmHg    BP Site Left arm     BP Method Manual      Measurements from flowsheet : Measurements   7/20/2017 3:26 PM CDT Height Measured - Standard 69 in    Weight Measured - Standard 250.4 lb    BSA 2.35 m2    Body Mass Index 36.97 kg/m2      General:  Alert and oriented X 3, No acute distress, Warm, Pink, Intact.         Appearance: Within normal limits, Well nourished, Calm.         Hydration: Within normal limits.         Psych: Within normal limits, Appropriate mood and affect, Cooperative, Normal judgment.    Genitourinary:  genitalia examined previously.

## 2022-02-16 NOTE — TELEPHONE ENCOUNTER
---------------------  From: Rosalia Mckeon CMA   To: Phone Messages Pool ([id number]_[facility]);     Sent: 11/1/2019 2:05:18 PM CDT  Subject: Stool results     PCP:   JOY      Time of Call:  1354       Person Calling:  Patient  Phone number:  [phone]    Returned call at: 1400    Note:   Patient called stating he dropped off stool samples in [place] yesterday and is looking for results. Informed patient these are send out and can take up to one week to come back. He understood and would like a call w/ results.    Last office visit and reason:  10/28/19 Abd pain w/ JOY

## 2022-02-16 NOTE — PROGRESS NOTES
Patient:   MIMI MART            MRN: [id number]            FIN: [id number]               Age:   54 years     Sex:  Male     :  1965   Associated Diagnoses:   Diarrhea; Epigastric pain   Author:   Karson Santana MD      Impression and Plan   Diagnosis     Diarrhea (PVH33-GM R19.7).     Epigastric pain (JSD52-HI R10.13).     Course:  Worsening.    Plan:    1. Lab work  2. GB US  3. Referral for EGD.    Orders     Orders   Charges (Evaluation and Management):  94338 office outpatient visit 25 minutes (Charge) (Order): Quantity: 1, Diarrhea  Epigastric pain.     Orders (Selected)   Outpatient Orders  Ordered  US Gallbladder (Request): Epigastric pain  Ordered (Dispatched)  CBC (h/h, RBC, indices, WBC, Plt)* ([facility]): Specimen Type: Blood, Collection Date: 10/28/19 10:10:00 CDT  Clostridium difficile Toxin/GDH with Reflex to PCR* ([facility]): Specimen Type: Stool, Collection Date: 10/28/19 10:10:00 CDT  Comprehensive Metabolic Panel* ([facility]): Specimen Type: Serum, Collection Date: 10/28/19 10:10:00 CDT  Culture, Stool, Sal/Shig/Campy and Shiga Toxins EIA w/rfl e.coli o157 Cult* ([facility]): Specimen Type: Stool, Collection Date: 10/28/19 10:10:00 CDT  Giardia And Cryptosporidium Antigen Panel* ([facility]): Specimen Type: Stool, Collection Date: 10/28/19 10:10:00 CDT  Helicobacter pylori Antigen, EIA, Stool* ([facility]): Specimen Type: Stool, Collection Date: 10/28/19 10:10:00 CDT  Lipase* ([facility]): Specimen Type: Serum, Collection Date: 10/28/19 10:10:00 CDT  Ova and Parasites, Concentrate and Permanent Smear* ([facility]): Specimen Type: Stool, Collection Date: 10/28/19 10:10:00 CDT  Sed rate by modified westergren* ([facility]): Specimen Type: Blood, Collection Date: 10/28/19 10:10:00 CDT.        Visit Information   Visit type:  New symptom.    Accompanied by:  No one.    Source of history:  Self.    History limitation:  None.       Chief Complaint   10/28/2019 9:36 AM CDT   Pt here for stomach pain        History of Present Illness              The patient presents with abdominal pain.  The abdominal pain is epigastric.  The abdominal pain is described as aching and cramping.  The severity of the abdominal pain is moderate.  The abdominal pain is constant and is worsening.  The abdominal pain has lasted for 1 month(s).  Radiation of pain: none.  The context of the abdominal pain: occurred unknown cause.  There are no modifying factors.  Associated symptoms consist of nausea, diarrhea, weight loss, fatigue, denies fever, denies vomiting, denies constipation, denies abdominal distention and denies dysuria.        Review of Systems   Constitutional:  Negative.    Eye:  Negative.    Ear/Nose/Mouth/Throat:  Negative.    Respiratory:  Negative.    Cardiovascular:  Negative.    Gastrointestinal:  Negative except as documented in history of present illness.    Genitourinary:  Negative.    Hematology/Lymphatics:  Negative.    Endocrine:  Negative.    Immunologic:  Negative.    Musculoskeletal:  Negative.    Integumentary:  Negative.    Neurologic:  Negative.    Psychiatric:  Negative.    All other systems reviewed and negative      Health Status   Allergies:    Allergic Reactions (Selected)  Severity Not Documented  Dilantin (Rash)   Medications:  (Selected)   Prescriptions  Prescribed  ProAir HFA 90 mcg/inh inhalation aerosol: See Instructions, Instructions: INHALE TWO PUFFS BY MOUTH FOUR TIMES A DAY AS NEEDED FOR WHEEZING OF FOR SHORTNESS OF BREATH, # 8.5 gm, 1 Refill(s), Type: Soft Stop, Pharmacy: [facility], INHALE TWO PUFFS BY MOUTH FOUR TIMES A DAY AS NEEDED FOR...  alfuzosin 10 mg oral tablet, extended release: = 1 tab(s) ( 10 mg ), po, daily, # 90 tab(s), 1 Refill(s), Type: Maintenance, Pharmacy: [facility], 1 tab(s) Oral daily  cyclobenzaprine 10 mg oral tablet: = 1 tab(s), PO, q6hr, PRN: for spasm, # 30 tab(s), 5 Refill(s), Type: Maintenance, other reason (Rx)  desoximetasone 0.05% topical cream: 1 jeff, TOP, BID, # 60 g, 1  Refill(s), Type: Maintenance, Pharmacy: [facility], 1 jeff Topical bid  furosemide 20 mg oral tablet: = 1 tab(s) ( 20 mg ), PO, Daily, PRN: for leg swelling, # 90 tab(s), 1 Refill(s), Type: Maintenance, Pharmacy: [facility], 1 tab(s) Oral daily,PRN:for leg swelling  methylphenidate 20 mg oral tablet: = 1 tab(s), Oral, daily, # 30 tab(s), 0 Refill(s), Type: Soft Stop  montelukast 10 mg oral tablet: = 1 tab(s) ( 10 mg ), Oral, daily, # 90 tab(s), 1 Refill(s), Type: Maintenance, Pharmacy: [facility], 1 tab(s) Oral daily  omeprazole 20 mg oral delayed release capsule: = 1 cap(s), Oral, bid, # 180 cap(s), 1 Refill(s), Type: Maintenance, Pharmacy: [facility]   Problem list:    All Problems  Acne / ICD-9-.1 / Confirmed  ADD (attention deficit disorder) / SNOMED CT 6649832837 / Confirmed  Adenomatous colon polyp / SNOMED CT 9667271094 / Confirmed  Allergic Rhinitis / ICD-9-.9 / Confirmed  Asthma / ICD-9-.90 / Confirmed  BPH (benign prostatic hypertrophy) / SNOMED CT 416207828 / Confirmed  Chronic Lumbar Pain / ICD-9-.2 / Confirmed  CTS (Carpal Tunnel Syndrome) / ICD-9-.0 / Confirmed  External Hemorrhoids / ICD-9-.3 / Confirmed  GERD (gastroesophageal reflux disease) / SNOMED CT 916369908 / Confirmed  Hyperlipidemia / SNOMED CT 04797582 / Confirmed  Increased BMI / ICD-9-.9 / Confirmed  Low testosterone / SNOMED CT 2194286475 / Confirmed  Lower leg edema / SNOMED CT 913357960 / Confirmed  Male hypogonadism / SNOMED CT 40280086 / Confirmed  Obesity / ICD-9-.00 / Probable  Onychomycosis / ICD-9-.1 / Confirmed  Osteoarthritis of Knee / ICD-9-.96 / Confirmed  Stasis Dermatitis / ICD-9-.1 / Confirmed  Canceled: Hyperlipemia / ICD-9-.4      Histories   Past Medical History:    Active  Onychomycosis (110.1): Onset on 8/27/2008 at 43 years.  Comments:  2/8/2010 CST 10:46 AM CST - Barnett LOU, [place]  left small nail  Osteoarthritis of Knee  (715.96)  Chronic Lumbar Pain (724.2)  Allergic Rhinitis (477.9)  Asthma (493.90)  External Hemorrhoids (455.3)  Stasis Dermatitis (454.1)  Acne (706.1)  CTS (Carpal Tunnel Syndrome) (354.0)  Increased BMI (783.9)   Family History:    Hypertension  Father ()  Heart disease  Father ()     Procedure history:    Colonoscopy (SNOMED CT 593342493) performed by Duarte Dean MD on 2018 at 53 Years.  Comments:  1/10/2019 9:36 AM CST - Tory Hicks  Indication:  Screening.  Sedation:  MAC.  Findings:  Polyps cecum, sigmoid colon, and right colon.  Thrombosed external hemorrhoids.  Perianal skin tags.  Recommendation:  Repeat in 5 years.      Physical Examination   Vital Signs   10/28/2019 9:36 AM CDT Temperature Tympanic 97.5 DegF  LOW    Peripheral Pulse Rate 65 bpm    Systolic Blood Pressure 128 mmHg    Diastolic Blood Pressure 74 mmHg    Mean Arterial Pressure 92 mmHg    Oxygen Saturation 98 %      Measurements from flowsheet : Measurements   10/28/2019 9:36 AM CDT Height Measured - Standard 69 in    Weight Measured - Standard 242 lb    BSA 2.31 m2    Body Mass Index 35.73 kg/m2  HI      General:  No acute distress.    Neck:  Supple, No lymphadenopathy, No thyromegaly.    Respiratory:  Lungs are clear to auscultation, Respirations are non-labored, Breath sounds are equal, Symmetrical chest wall expansion.    Cardiovascular:  Normal rate, Regular rhythm, No murmur, No gallop, Good pulses equal in all extremities, Normal peripheral perfusion, No edema.    Gastrointestinal:  Soft, Non-distended, Normal bowel sounds, No organomegaly, No gaurding or rebound or percussion tenderness, epigastrum moderately tender to deep palpation R>L.    Musculoskeletal:  Normal gait.    Integumentary:  Warm, Dry, Pink.    Neurologic:  Alert, Oriented.    Psychiatric:  Cooperative, Appropriate mood & affect, Normal judgment.   EKG

## 2022-02-16 NOTE — TELEPHONE ENCOUNTER
---------------------  From: Bethany Garcia CMA (eRx Pool ([id number]_[facility]))   To: Karson Santana MD;     Sent: 3/28/2019 3:38:51 PM CDT  Subject: FW: Medication Management   Due Date/Time: 3/29/2019 3:30:00 PM CDT           ------------------------------------------  From: [facility]  To: Karson Santana MD  Sent: March 28, 2019 3:30:23 PM CDT  Subject: Medication Management  Due: March 29, 2019 3:30:23 PM CDT    ** On Hold Pending Signature **  Drug: methylphenidate (methylphenidate 20 mg oral tablet)  TAKE ONE TABLET BY MOUTH EVERY DAY  Quantity: 30 tab(s)     Days Supply: 30        Refills: 0  Substitutions Allowed  Notes from Pharmacy:     Dispensed Drug: methylphenidate (methylphenidate 20 mg oral tablet)  TAKE ONE TABLET BY MOUTH EVERY DAY  Quantity: 30 tab(s)     Days Supply: 30        Refills: 0  Substitutions Allowed  Notes from Pharmacy:   ---------------------------------------------------------------  From: Karson Santana MD   To: [facility]    Sent: 3/28/2019 4:45:47 PM CDT  Subject: FW: Medication Management     ** Submitted: **  Complete:methylphenidate (methylphenidate 20 mg oral tablet)   Signed by Karson Santana MD  3/28/2019 4:45:00 PM    ** Approved **  methylphenidate (METHYLPHENIDATE HCL 20MG TABS)  TAKE ONE TABLET BY MOUTH EVERY DAY  Qty:  30 tab(s)        Days Supply:  30          Substitutions Allowed     Route To Pharmacy - [facility]